# Patient Record
Sex: MALE | Race: OTHER | NOT HISPANIC OR LATINO | Employment: UNEMPLOYED | URBAN - METROPOLITAN AREA
[De-identification: names, ages, dates, MRNs, and addresses within clinical notes are randomized per-mention and may not be internally consistent; named-entity substitution may affect disease eponyms.]

---

## 2022-10-18 ENCOUNTER — HOSPITAL ENCOUNTER (EMERGENCY)
Facility: HOSPITAL | Age: 52
Discharge: HOME | End: 2022-10-18
Attending: EMERGENCY MEDICINE
Payer: COMMERCIAL

## 2022-10-18 VITALS
BODY MASS INDEX: 28.16 KG/M2 | HEART RATE: 99 BPM | HEIGHT: 72 IN | OXYGEN SATURATION: 100 % | WEIGHT: 207.88 LBS | RESPIRATION RATE: 20 BRPM | TEMPERATURE: 98 F | SYSTOLIC BLOOD PRESSURE: 181 MMHG | DIASTOLIC BLOOD PRESSURE: 104 MMHG

## 2022-10-18 DIAGNOSIS — M79.661 RIGHT CALF PAIN: Primary | ICD-10-CM

## 2022-10-18 DIAGNOSIS — S86.911A MUSCLE STRAIN OF RIGHT LOWER LEG, INITIAL ENCOUNTER: ICD-10-CM

## 2022-10-18 PROCEDURE — 99284 EMERGENCY DEPT VISIT MOD MDM: CPT | Mod: 25

## 2022-10-18 PROCEDURE — 96372 THER/PROPH/DIAG INJ SC/IM: CPT | Performed by: PHYSICIAN ASSISTANT

## 2022-10-18 PROCEDURE — 63600175 PHARM REV CODE 636 W HCPCS: Performed by: PHYSICIAN ASSISTANT

## 2022-10-18 RX ORDER — METHOCARBAMOL 500 MG/1
1000 TABLET, FILM COATED ORAL 3 TIMES DAILY PRN
Qty: 30 TABLET | Refills: 0 | Status: SHIPPED | OUTPATIENT
Start: 2022-10-18 | End: 2022-10-23

## 2022-10-18 RX ORDER — KETOROLAC TROMETHAMINE 10 MG/1
10 TABLET, FILM COATED ORAL EVERY 6 HOURS PRN
Qty: 20 TABLET | Refills: 0 | Status: SHIPPED | OUTPATIENT
Start: 2022-10-18 | End: 2022-10-23

## 2022-10-18 RX ORDER — KETOROLAC TROMETHAMINE 30 MG/ML
15 INJECTION, SOLUTION INTRAMUSCULAR; INTRAVENOUS
Status: COMPLETED | OUTPATIENT
Start: 2022-10-18 | End: 2022-10-18

## 2022-10-18 RX ADMIN — KETOROLAC TROMETHAMINE 15 MG: 30 INJECTION, SOLUTION INTRAMUSCULAR at 04:10

## 2022-10-18 NOTE — ED PROVIDER NOTES
Encounter Date: 10/18/2022       History     Chief Complaint   Patient presents with    Leg Pain     C/o left calf/leg pain and swelling since last night. HTN noted. Pt didn't take his bp medications this am.     Osito Durant is a 52 y.o. male with no past medical history who presents to the ED complaining of right calf pain. He reports that yesterday he was chasing his dog who jumped out of the house window when he bent his leg in a weird way and felt a pop in his calf. He developed immediate pain and swelling to his right lower leg. He reports pain with bearing weight onto his leg. He denies any numbness/tingling, N/V, fevers chills, back pain.    The history is provided by the patient. No  was used.   Review of patient's allergies indicates:  No Known Allergies  No past medical history on file.  No past surgical history on file.  No family history on file.     Review of Systems   Constitutional:  Negative for chills and fever.   HENT:  Negative for congestion and sneezing.    Eyes:  Negative for photophobia and visual disturbance.   Respiratory:  Negative for cough and shortness of breath.    Cardiovascular:  Negative for chest pain and leg swelling.   Gastrointestinal:  Negative for abdominal pain and constipation.   Genitourinary:  Negative for dysuria and frequency.   Musculoskeletal:  Positive for gait problem, joint swelling and myalgias. Negative for back pain.   Skin:  Negative for rash and wound.   Neurological:  Negative for dizziness and seizures.   Psychiatric/Behavioral:  Negative for agitation and confusion.      Physical Exam     Initial Vitals [10/18/22 1555]   BP Pulse Resp Temp SpO2   (!) 181/104 99 20 97.7 °F (36.5 °C) 100 %      MAP       --         Physical Exam    Nursing note and vitals reviewed.  Constitutional: He appears well-developed and well-nourished. No distress.   HENT:   Head: Normocephalic and atraumatic.   Mouth/Throat: No oropharyngeal exudate.   Eyes: EOM  are normal. No scleral icterus.   Neck: Neck supple.   Normal range of motion.  Cardiovascular:  Normal rate and regular rhythm.           No murmur heard.  Pulmonary/Chest: No respiratory distress. He has no wheezes.   Abdominal: Abdomen is soft. He exhibits no distension. There is no abdominal tenderness.   Musculoskeletal:         General: Tenderness (right calf) and edema (right lower leg) present.      Cervical back: Normal range of motion and neck supple.      Comments: Difficulty bearing weight onto right leg. Full passive and active ROM of right knee and foot. Exquisite TTP of right calf. Pulses palpable. Normal cap refill     Neurological: He is alert and oriented to person, place, and time. No cranial nerve deficit.   Skin: Skin is warm and dry. Capillary refill takes less than 2 seconds. No erythema.   Psychiatric: He has a normal mood and affect. Thought content normal.       ED Course   Procedures  Labs Reviewed - No data to display       Imaging Results              CT Leg (Tibia-Fibula) Without Contrast Right (Final result)  Result time 10/18/22 17:28:21      Final result by Lazaro Colunga MD (10/18/22 17:28:21)                   Impression:      Mild amount of soft tissue swelling in the anterolateral aspect of the leg otherwise unremarkable      Electronically signed by: Lazaro Colunga  Date:    10/18/2022  Time:    17:28               Narrative:    EXAMINATION:  CT LEG (TIBIA-FIBULA) WITHOUT CONTRAST RIGHT    CLINICAL HISTORY:  Lower leg trauma, no prior imaging;    TECHNIQUE:  Multiple axial images were obtained of the right leg without contrast administration and sagittal and coronal reconstructions were performed.  Automatic exposure control (AEC) is utilized to reduce patient radiation exposure.    COMPARISON:  .    FINDINGS:  The tibia and fibula appear normal.  No fracture is seen.  No dislocation is seen.  Distal femur appears normal.  Patella appears grossly unremarkable.  There  is some mild soft tissue swelling along the anterior aspect of the leg but no fluid collection is seen. No hematoma is seen. No mass is seen                                       Medications   ketorolac injection 15 mg (15 mg Intramuscular Given 10/18/22 4562)     Medical Decision Making:   ED Management:  The patient is resting comfortably and in no acute distress. I personally discussed his test results and treatment plan.  Gave strict ED precautions and specific conditions for return to the emergency department and importance of follow up with pcp.  Patient voices understanding and agrees to the plan discussed. All patients' questions have been answered at this time. He has remained hemodynamically stable throughout entire stay in ED and is stable for discharge home.                        Clinical Impression:   Final diagnoses:  [M79.661] Right calf pain (Primary)  [S86.911A] Muscle strain of right lower leg, initial encounter      ED Disposition Condition    Discharge Stable          ED Prescriptions       Medication Sig Dispense Start Date End Date Auth. Provider    ketorolac (TORADOL) 10 mg tablet Take 1 tablet (10 mg total) by mouth every 6 (six) hours as needed for Pain. 20 tablet 10/18/2022 10/23/2022 Breanne Kennedy PA-C    methocarbamoL (ROBAXIN) 500 MG Tab Take 2 tablets (1,000 mg total) by mouth 3 (three) times daily as needed (muscle spasms, pain). 30 tablet 10/18/2022 10/23/2022 Breanne Kennedy PA-C          Follow-up Information       Follow up With Specialties Details Why Contact Info    [facility] Emergency [facility]  If symptoms worsen [address] [facility] [address] [phone]    PCP  In 3 days Hospital follow up              Breanne Kennedy PA-C  10/18/22 1622

## 2022-10-18 NOTE — Clinical Note
"Osito Caoarsalan Durant was seen and treated in our emergency department on 10/18/2022.  He may return to work on 10/24/2022.       If you have any questions or concerns, please don't hesitate to call.       RN    "

## 2022-10-26 ENCOUNTER — HOSPITAL ENCOUNTER (OUTPATIENT)
Dept: RADIOLOGY | Facility: HOSPITAL | Age: 52
Discharge: HOME | End: 2022-10-26
Attending: STUDENT IN AN ORGANIZED HEALTH CARE EDUCATION/TRAINING PROGRAM
Payer: COMMERCIAL

## 2022-10-26 ENCOUNTER — OFFICE VISIT (OUTPATIENT)
Dept: ORTHOPEDICS | Facility: CLINIC | Age: 52
End: 2022-10-26
Payer: COMMERCIAL

## 2022-10-26 VITALS
HEART RATE: 83 BPM | BODY MASS INDEX: 28.04 KG/M2 | RESPIRATION RATE: 16 BRPM | WEIGHT: 207 LBS | HEIGHT: 72 IN | SYSTOLIC BLOOD PRESSURE: 175 MMHG | DIASTOLIC BLOOD PRESSURE: 109 MMHG

## 2022-10-26 DIAGNOSIS — M79.661 RIGHT CALF PAIN: ICD-10-CM

## 2022-10-26 DIAGNOSIS — S86.111A RUPTURE OF RIGHT GASTROCNEMIUS TENDON, INITIAL ENCOUNTER: Primary | ICD-10-CM

## 2022-10-26 PROCEDURE — 73590 X-RAY EXAM OF LOWER LEG: CPT | Mod: TC,RT

## 2022-10-26 PROCEDURE — 99215 OFFICE O/P EST HI 40 MIN: CPT | Mod: PBBFAC

## 2022-10-26 RX ORDER — NIFEDIPINE 60 MG/1
60 TABLET, EXTENDED RELEASE ORAL DAILY
Status: ON HOLD | COMMUNITY
Start: 2022-10-07 | End: 2023-01-28 | Stop reason: HOSPADM

## 2022-10-26 RX ORDER — LISINOPRIL 10 MG/1
1 TABLET ORAL DAILY
Status: ON HOLD | COMMUNITY
Start: 2022-04-25 | End: 2023-01-28 | Stop reason: HOSPADM

## 2022-10-26 NOTE — PROGRESS NOTES
Subjective:    Patient ID: Osito Durant is a 52 y.o. male  who presented to [facility] for new visit..      Chief Complaint: Pain of the Right Lower Leg    History of Present Illness:  Osito Durant who has no formally previously diagnosed musculoskeletal condition presented today with right calf pain with DOI 10/17/2022. Patient states that he was sitting on the couch and jumped out of his first floor window chasing his dog. Immediately he had calf pain and swelling. Subsequently patient was seen in the ED with CT scan not revealing any fractures or deformities. Patient has been unable to bear weight since that time. Has been using crutches for ambulation and taking diclofenac for pain control. States that his pain has been a 9/10. Pain exacerbated by any movement of the foot.      Knee Review of Systems:  Swelling?  yes  Limited ROM? yes  Fever/Chills? No    Current Choice of Exercise:  none       Objective:      Physical Exam:    BP (!) 175/109 Comment: Asymptoamtic. Dr mark.  Pulse 83   Resp 16   Ht 6' (1.829 m)   Wt 93.9 kg (207 lb)   BMI 28.07 kg/m²     Appearance:  NWB    Soft tissue swelling: Left: no Right: yes  Effusion: Left:  Negative Right: Negative  Erythema: Left no Right: no  Ecchymosis: Left: no Right: no      Palpation:  Ankle/Foot Tenderness: Left: non tender Right: mid calf pain ttp.    Range of motion:  Ankle dorsiflexion (20 degrees):     Left: 20 Right: 5  Ankle Plantar flexion (50 degrees): Left 50 Right: 10  Subtalar inversion (5 degrees): Left: 5 Right 1  Subtalar eversion (5 degrees):  Left: 5 Right 5  Transverse/midtarsal: adduction (20 degrees): Left: 20 Right: 5  Transverse/midtarsal abduction (10 degrees): Left: 10 Right: 10    Strength:  Foot inversion/dorsiflexion (Deep Peroneal N. L4):Left: 5/5  Pain: no Right: 2/5  Pain: yes  1st toe Dorsiflexion (Deep Peroneal N. L5): Left: 5/5  Pain: no Right: 2/5  Pain: yes  Foot  plantarflexion (Tibial N. S1): Left: 5/5  Pain: no Right: 2/5  Pain: yes  Foot eversion (superficial peroneal L4-S1): Left: 5/5  Pain: no Right: 5/5  Pain: no      Special Tests:  Parish:  Left: Not performed     Right: Not performed d/t pain    General appearance: NAD  Peripheral pulses: normal bilaterally   Reflexes: Left: normal Right normal   Sensation: normal    Labs:  Last A1c: The patient doesn't have any registry metric data available     Imaging:   Previous images reviewed.  X-rays ordered and performed today: no  Impression:  Mild amount of soft tissue swelling in the anterolateral aspect of the leg otherwise unremarkable    Limited US  Limited bedside ultrasound showing a medial head gastrocnemius tear with a hematoma 3cm x 2 cm of the RLE with intact achilles tendon.    Assessment:        Encounter Diagnoses   Name Primary?    Rupture of right gastrocnemius tendon, initial encounter Yes    Right calf pain         Plan:      Dx: Medial head gastrocnemius tear with DOI 10/17/2022  Acute in severe exacerbation.   Treatment Plan: Discussed with patient diagnosis, prognosis, and treatment recommendations. Education provided.  Home physical therapy exercise handouts provided to patient.   formal PT script provided to patient. You may take this script to whichever physical therapist you would like to go to.   Over the counter NSAID and/or tylenol provided you do not have contraindications such as but not limited to liver or kidney disease or uncontrolled blood pressure. If you're doctors have told you to to not take them based on your health, do not take them.   Imaging: prior radiological studies independently reviewed; discussed with patient; agree with radiologist interpretation.   Weight Management: is paramount. maintain healthy weight of a bmi of 24.9 or less..   Activity: Activity as tolerated; HEP to include aerobic conditioning and strength training with non-painful activity. ROM/STG exercises.  Proper footware; assistive devises to avoid limping.   Therapy: Physical Therapy  Medication: first line treatment with daily acetaminophen. Up to 1000 mg three times daily can be taken; medication precautions given.. Please see your primary care physician for further refills.  RTC: 4 weeks.    Anca Stiles

## 2022-10-26 NOTE — LETTER
October 26, 2022      [facility]  [facility] [facility] [address]  Phone: [phone]       Patient: Osito Durant   YOB: 1970  Date of Visit: 10/26/2022    To Whom It May Concern:    Jaquelin Durant  was at [facility] on 10/26/2022. The patient may return to work/school if applicable with restrictions. Next appointment with clinic 11/2022. The next 4 weeks please allow limited use of right leg as tolerated no prolong standing, walking, stooping or crawling. Allow for crutch use with slow ambulation. If you have any questions or concerns, or if I can be of further assistance, please do not hesitate to contact me.    Sincerely,  Dr. Jhonatan Roy MA

## 2022-11-02 NOTE — PROGRESS NOTES
Faculty Attestation: Osito Durant  was seen in Sports Medicine Clinic. Discussed with Dr. Stiles at the time of the visit. History of Present Illness, Physical Exam, and Assessment and Plan reviewed. Treatment plan is reasonable and appropriate. Compliance with treatment recommendations is important.  Radiology images independently reviewed and agree with radiologist interpretation.  No procedure was performed.     Nely Dhillon MD  Family/Sports Medicine

## 2022-11-23 ENCOUNTER — OFFICE VISIT (OUTPATIENT)
Dept: ORTHOPEDICS | Facility: CLINIC | Age: 52
End: 2022-11-23
Payer: COMMERCIAL

## 2022-11-23 VITALS
WEIGHT: 208 LBS | HEART RATE: 60 BPM | SYSTOLIC BLOOD PRESSURE: 164 MMHG | BODY MASS INDEX: 28.17 KG/M2 | DIASTOLIC BLOOD PRESSURE: 126 MMHG | HEIGHT: 72 IN

## 2022-11-23 DIAGNOSIS — S86.111A RUPTURE OF RIGHT GASTROCNEMIUS TENDON, INITIAL ENCOUNTER: Primary | ICD-10-CM

## 2022-11-23 PROCEDURE — 99213 OFFICE O/P EST LOW 20 MIN: CPT | Mod: PBBFAC

## 2022-11-23 NOTE — PROGRESS NOTES
"  Subjective:    Patient ID: Osito Durant is a 52 y.o. male  who presented to [facility] for new visit..    Chief Complaint: Pain of the Right Lower Leg    History of Present Illness:  Osito Durant who has no formally previously diagnosed musculoskeletal condition presented today for a right gastrocnemius muscle belly tear  with DOI 10/17/2022 after he jumped out of the 1st floor window chasing his dog.  Patient feels significantly better today than when he 1st came in to see us on October 26th.  States that the swelling has improved on his right lower extremity.  Pain is also improved.  He is now off of his crutches and ambulating as tolerated.  Has not been able to go to physical therapy yet.  Has been trying icing, over-the-counter analgesics, and elevation.  Pain is relatively well controlled and he has no take any analgesics over the last couple of weeks.  States that the worst pain is typically in the morning with the 1st few steps and also with the push-off phase of his walking.  Denies any skin color changes, right lower extremity warm/erythema, or any fevers chills.  Denies any loss of sensation to the right lower extremity.     Knee Review of Systems:  Swelling?  yes  Limited ROM? yes  Fever/Chills? No    Current Choice of Exercise:  none       Objective:      Physical Exam:    BP (!) 164/126 (BP Location: Left arm)   Pulse 60   Ht 6' 0.01" (1.829 m)   Wt 94.3 kg (208 lb)   BMI 28.20 kg/m²     Appearance:  WBAT    Soft tissue swelling: Left: no Right: yes, improved from prior visit with trace pitting edema  Effusion: Left:  Negative Right: Negative  Erythema: Left no Right: no  Ecchymosis: Left: no Right: no    Palpation:  Ankle/Foot Tenderness: Left: non tender Right: mid calf pain ttp.    Range of motion:  Ankle dorsiflexion (20 degrees):     Left: 20 Right: 10  Ankle Plantar flexion (50 degrees): Left 50 Right: 25  Subtalar inversion (5 degrees): " Left: 5 Right 3  Subtalar eversion (5 degrees):  Left: 5 Right 5  Transverse/midtarsal: adduction (20 degrees): Left: 20 Right: 10  Transverse/midtarsal abduction (10 degrees): Left: 10 Right: 10    Strength:  Foot inversion/dorsiflexion (Deep Peroneal N. L4):Left: 5/5  Pain: no Right: 3/5  Pain: yes  1st toe Dorsiflexion (Deep Peroneal N. L5): Left: 5/5  Pain: no Right: 3/5  Pain: yes  Foot plantarflexion (Tibial N. S1): Left: 5/5  Pain: no Right: 3/5  Pain: yes  Foot eversion (superficial peroneal L4-S1): Left: 5/5  Pain: no Right: 5/5  Pain: no      Special Tests:  Parish:  Left: Not performed     Right: Intact    General appearance: NAD  Peripheral pulses: normal bilaterally   Reflexes: Left: normal Right normal   Sensation: normal    Labs:  Last A1c: The patient doesn't have any registry metric data available     Imaging:   Previous images reviewed.  X-rays ordered and performed today: no  Impression:Mild amount of soft tissue swelling in the anterolateral aspect of the leg otherwise unremarkable    Limited US  Limited bedside ultrasound showing a medial head gastrocnemius tear with a hematoma around 2cm x 2 cm of the RLE with intact achilles tendon. Compressible popliteal vein.     Assessment:        Encounter Diagnosis   Name Primary?    Rupture of right gastrocnemius tendon, initial encounter Yes     Plan:      Dx: Medial head gastrocnemius tear with DOI 10/17/2022  Acute in severe exacerbation.   Treatment Plan: Discussed with patient diagnosis, prognosis, and treatment recommendations. Education provided.    - Formal PT script provided to patient at first visit / Home physical therapy exercise handouts provided to patient.   - Over the counter NSAID and/or tylenol provided you do not have contraindications such as but not limited to liver or kidney disease or uncontrolled blood pressure.   - Given the trace edema on the RLE, discussed the possible risk of a DVT. Although the bedside US shows compressible  popliteal veins, offered patient a r/o DVT US which he declined at this time. Also declined any more advanced imaging with MRI at this time  - Will allow patient to ambulate as tolerated with a SWB with heel wedge  - Patient to start off with 2 cm heel wedge and incrementally remove wedges over the next 2 weeks  - RTC to clinic in 4 weeks for re-evaluation   Imaging: prior radiological studies independently reviewed; discussed with patient; agree with radiologist interpretation.   Weight Management: is paramount. maintain healthy weight of a bmi of 24.9 or less..   Activity: Activity as tolerated  Therapy: Physical Therapy    RTC: 4 weeks.    Anca Stiles

## 2022-12-13 ENCOUNTER — OFFICE VISIT (OUTPATIENT)
Dept: ORTHOPEDICS | Facility: CLINIC | Age: 52
End: 2022-12-13
Payer: COMMERCIAL

## 2022-12-13 VITALS
SYSTOLIC BLOOD PRESSURE: 159 MMHG | WEIGHT: 212.63 LBS | DIASTOLIC BLOOD PRESSURE: 109 MMHG | HEIGHT: 72 IN | BODY MASS INDEX: 28.8 KG/M2 | HEART RATE: 80 BPM

## 2022-12-13 DIAGNOSIS — S86.111D RUPTURE OF RIGHT GASTROCNEMIUS TENDON, SUBSEQUENT ENCOUNTER: Primary | ICD-10-CM

## 2022-12-13 DIAGNOSIS — R60.0 LOWER EXTREMITY EDEMA: ICD-10-CM

## 2022-12-13 PROBLEM — I10 PRIMARY HYPERTENSION: Status: ACTIVE | Noted: 2022-12-13

## 2022-12-13 PROCEDURE — 99213 OFFICE O/P EST LOW 20 MIN: CPT | Mod: PBBFAC

## 2022-12-13 NOTE — PROGRESS NOTES
Subjective:    Patient ID: Osito Durant is a 52 y.o. male  who presented to [facility] for follow up.    Chief Complaint: Pain of the Right Lower Leg    History of Present Illness:  Osito Durant is a 52-year-old male who presents today for follow-up of right medial gastrocnemius tear.  Date of injury is 10/17/2022.  He was last seen on 11/23/2022.  Since that time, his pain has virtually resolved.  He continues in his walking boot but has removed all heel wedges.  He has been working with his neighbor who is a physical therapist but has not gone to formal PT. He continues to have notable swelling of his right lower extremity which has limited his ability to put on sneakers when he is at his home.  He is compliant with leg elevation and says that he does it for multiple hours a day but still has swelling of his leg.    Ankle/Foot Review of Systems:  Swelling?  yes  Instability?  no  Mechanical sx?  no  <30 min AM stiffness? no  Limited ROM? no  Fever/Chills? no     Objective:      Physical Exam:    BP (!) 159/109   Pulse 80   Ht 6' (1.829 m)   Wt 96.4 kg (212 lb 9.6 oz)   BMI 28.83 kg/m²     Appearance:  limping  Nonweightbearing to right lower extremity in walking boot  Soft tissue swelling: Left: no Right: yes  Erythema: Left no Right: no  Ecchymosis: Left: no Right: no  Decreased hair distribution of bilateral lower extremities from mid shin to his ankle    Palpation:  Ankle/Foot Tenderness: Left: non tender  Right:  Mild tenderness approximately 3-4 cm distal to the popliteal fossa on the medial aspect.    Range of motion:  Ankle dorsiflexion (20 degrees):     Left: 20 Right: 15  Ankle Plantar flexion (50 degrees): Left 50 Right: 40  Subtalar inversion (5 degrees): Left: 5 Right 5  Subtalar eversion (5 degrees):  Left: 5 Right 5  Transverse/midtarsal: adduction (20 degrees): Left: 20 Right: 20  Transverse/midtarsal abduction (10 degrees): Left: 10 Right:  10    Strength:  Foot inversion/dorsiflexion (Deep Peroneal N. L4):Left: 5/5  Pain: no Right: 5/5  Pain: no  1st toe Dorsiflexion (Deep Peroneal N. L5): Left: 5/5  Pain: no Right: 5/5  Pain: no  Foot plantarflexion (Tibial N. S1): Left: 5/5  Pain: no Right: 5/5  Pain: no  Foot eversion (superficial peroneal L4-S1): Left: 5/5  Pain: no Right: 5/5  Pain: no      Parish:  Left: Negative  Right: Negative     General appearance: NAD  Peripheral pulses: normal bilaterally   Sensation: normal    Imaging:   Previous images reviewed.  X-rays ordered and performed today: no  # of views: 4 Laterality: right  My Interpretation:  Mild soft tissue edema in the lower extremity.  No osseous abnormalities.  No acute fractures or dislocations appreciated.      Limited bedside ultrasound   Right medial head of the gastroc with small hematoma approximately 3-4 cm distal to the popliteal fossa at the musculotendinous junction measuring 1 cm X 1 cm.  Notable soft tissue edema of the right lower extremity.  Compressible veins throughout the lower extremity.    Assessment:      Encounter Diagnoses   Code Name Primary?    S86.111D Rupture of right gastrocnemius tendon, subsequent encounter Yes    R60.0 Lower extremity edema       Plan:     Dx: right gastrocnemius rupture at the musculotendinous junction with injury date 10/17/2022.  Continued right lower extremity edema likely due to venous insufficiency  with low concern for DVT given 2 separate bedside ultrasound with compressible vasculature.  Treatment Plan:   Discussed with patient diagnosis, prognosis, and treatment recommendations. Education provided.    Continue nonweightbearing to right lower extremity and use walking boot when ambulating.  Can remove walking boot while at home and nonambulatory   Continue to elevate leg when at rest making sure to keep leg above the level of the heart.  This should be done as often as possible throughout the day   Recommend doing calf pump  exercises nonweightbearing.  He should do at least 20 of these exercises every hour while awake.  He can continue working with his neighbor on his therapy.  However, if he does not see significant improvement, he should take the previously provided prescription to formal PT   Prescription provided for compression stocking which he should wear to the right lower extremity.  His physical exam and ultrasound findings do not provide concern for DVT.  His swelling is more likely related to venous insufficiency.  However, he was advised on the signs and symptoms of DVT and to report to the ER if he notices these symptoms.  He may return to work with the restrictions that he must be nonweightbearing to his right lower extremity.  Follow-up with PCP regarding increased fatigue after exertion.  Imaging: prior radiological studies independently reviewed; discussed with patient; agree with radiologist interpretation.   Weight Management: is paramount. maintain healthy weight of a bmi of 24.9 or less..   Procedure: Discussed CSI/VSI as treatment options; patient not a candidate for CSI or VS.  Activity:  Continue nonweightbearing to right lower extremity; HEP to include aerobic conditioning and strength training with non-painful activity. ROM/STG exercises.  Continue wearing boot on right lower extremity.    Therapy: Physical Therapy  Medication: first line treatment with daily acetaminophen. Up to 1000 mg three times daily can be taken; medication precautions given. and start OTC NSAIDs; medication precautions. Please see your primary care physician for further refills.  RTC:  3 weeks.      HTN:   Nonsteroidal anti-inflammatory drugs (NSAIDs) may disrupt control of blood pressure in hypertensive patients and increase their risk of morbidity, mortality, and the costs of care. In general, people with hypertension should use acetaminophen or possibly aspirin for over-the-counter pain relief. Discuss with your primary healthcare  provider if the use of NSAIDs is appropriate for you.

## 2022-12-14 NOTE — PROGRESS NOTES
Faculty Attestation: Osito Durant  was seen in [facility] Medicine Clinic. Discussed with Dr. Quezada at the time of the visit. History of Present Illness, Physical Exam, and Assessment and Plan reviewed. Treatment plan is reasonable and appropriate. Compliance with treatment recommendations is important.  Radiology images independently reviewed and agree with radiologist interpretation. No procedure was performed. Activity NWB to right lower extremity with range of motion activity out of boot including foot pumps.  Start formal PT.    Nely Dhillon MD  Family/Sports Medicine

## 2023-01-24 ENCOUNTER — HOSPITAL ENCOUNTER (INPATIENT)
Facility: HOSPITAL | Age: 53
LOS: 4 days | Discharge: HOME | DRG: 304 | End: 2023-01-28
Attending: FAMILY MEDICINE | Admitting: INTERNAL MEDICINE
Payer: COMMERCIAL

## 2023-01-24 DIAGNOSIS — I16.1 HYPERTENSIVE EMERGENCY: ICD-10-CM

## 2023-01-24 DIAGNOSIS — I10 PRIMARY HYPERTENSION: ICD-10-CM

## 2023-01-24 DIAGNOSIS — R53.1 WEAKNESS: ICD-10-CM

## 2023-01-24 DIAGNOSIS — I77.79 CELIAC ARTERY DISSECTION: Primary | ICD-10-CM

## 2023-01-24 LAB
ALBUMIN SERPL-MCNC: 3.7 G/DL (ref 3.5–5)
ALBUMIN/GLOB SERPL: 0.8 RATIO (ref 1.1–2)
ALP SERPL-CCNC: 91 UNIT/L (ref 40–150)
ALT SERPL-CCNC: 8 UNIT/L (ref 0–55)
AMPHET UR QL SCN: NEGATIVE
APPEARANCE UR: CLEAR
AST SERPL-CCNC: 17 UNIT/L (ref 5–34)
BACTERIA #/AREA URNS AUTO: ABNORMAL /HPF
BARBITURATE SCN PRESENT UR: NEGATIVE
BASOPHILS # BLD AUTO: 0.04 X10(3)/MCL (ref 0–0.2)
BASOPHILS NFR BLD AUTO: 0.6 %
BENZODIAZ UR QL SCN: NEGATIVE
BILIRUB UR QL STRIP.AUTO: NEGATIVE MG/DL
BILIRUBIN DIRECT+TOT PNL SERPL-MCNC: 0.5 MG/DL
BUN SERPL-MCNC: 13.5 MG/DL (ref 8.4–25.7)
CALCIUM SERPL-MCNC: 9.8 MG/DL (ref 8.4–10.2)
CANNABINOIDS UR QL SCN: NEGATIVE
CHLORIDE SERPL-SCNC: 103 MMOL/L (ref 98–107)
CO2 SERPL-SCNC: 24 MMOL/L (ref 22–29)
COCAINE UR QL SCN: NEGATIVE
COLOR UR AUTO: YELLOW
CREAT SERPL-MCNC: 2.1 MG/DL (ref 0.73–1.18)
EOSINOPHIL # BLD AUTO: 0.19 X10(3)/MCL (ref 0–0.9)
EOSINOPHIL NFR BLD AUTO: 2.8 %
ERYTHROCYTE [DISTWIDTH] IN BLOOD BY AUTOMATED COUNT: 13.5 % (ref 11.5–17)
FENTANYL UR QL SCN: NEGATIVE
GFR SERPLBLD CREATININE-BSD FMLA CKD-EPI: 38 MLS/MIN/1.73/M2
GLOBULIN SER-MCNC: 4.5 GM/DL (ref 2.4–3.5)
GLUCOSE SERPL-MCNC: 90 MG/DL (ref 74–100)
GLUCOSE UR QL STRIP.AUTO: NORMAL MG/DL
HCT VFR BLD AUTO: 44.6 % (ref 42–52)
HGB BLD-MCNC: 14.6 GM/DL (ref 14–18)
HYALINE CASTS #/AREA URNS LPF: ABNORMAL /LPF
IMM GRANULOCYTES # BLD AUTO: 0.02 X10(3)/MCL (ref 0–0.04)
IMM GRANULOCYTES NFR BLD AUTO: 0.3 %
KETONES UR QL STRIP.AUTO: NEGATIVE MG/DL
LACTATE SERPL-SCNC: 0.9 MMOL/L (ref 0.5–2.2)
LEUKOCYTE ESTERASE UR QL STRIP.AUTO: NEGATIVE UNIT/L
LYMPHOCYTES # BLD AUTO: 1.01 X10(3)/MCL (ref 0.6–4.6)
LYMPHOCYTES NFR BLD AUTO: 15 %
MAGNESIUM SERPL-MCNC: 2 MG/DL (ref 1.6–2.6)
MCH RBC QN AUTO: 28.1 PG
MCHC RBC AUTO-ENTMCNC: 32.7 MG/DL (ref 33–36)
MCV RBC AUTO: 85.9 FL (ref 80–94)
MDMA UR QL SCN: NEGATIVE
MONOCYTES # BLD AUTO: 0.63 X10(3)/MCL (ref 0.1–1.3)
MONOCYTES NFR BLD AUTO: 9.3 %
MUCOUS THREADS URNS QL MICRO: ABNORMAL /LPF
NEUTROPHILS # BLD AUTO: 4.86 X10(3)/MCL (ref 2.1–9.2)
NEUTROPHILS NFR BLD AUTO: 72 %
NITRITE UR QL STRIP.AUTO: NEGATIVE
NRBC BLD AUTO-RTO: 0 %
OPIATES UR QL SCN: NEGATIVE
PCP UR QL: NEGATIVE
PH UR STRIP.AUTO: 6 [PH]
PH UR: 6 [PH] (ref 3–11)
PLATELET # BLD AUTO: 170 X10(3)/MCL (ref 130–400)
PMV BLD AUTO: 12.7 FL (ref 7.4–10.4)
POTASSIUM SERPL-SCNC: 4.2 MMOL/L (ref 3.5–5.1)
PROT SERPL-MCNC: 8.2 GM/DL (ref 6.4–8.3)
PROT UR QL STRIP.AUTO: ABNORMAL MG/DL
RBC # BLD AUTO: 5.19 X10(6)/MCL (ref 4.7–6.1)
RBC #/AREA URNS AUTO: ABNORMAL /HPF
RBC UR QL AUTO: ABNORMAL UNIT/L
SODIUM SERPL-SCNC: 137 MMOL/L (ref 136–145)
SP GR UR STRIP.AUTO: 1.02
SQUAMOUS #/AREA URNS LPF: ABNORMAL /HPF
TROPONIN I SERPL-MCNC: <0.01 NG/ML (ref 0–0.04)
UROBILINOGEN UR STRIP-ACNC: NORMAL MG/DL
WBC # SPEC AUTO: 6.8 X10(3)/MCL (ref 4.5–11.5)
WBC #/AREA URNS AUTO: ABNORMAL /HPF

## 2023-01-24 PROCEDURE — 63600175 PHARM REV CODE 636 W HCPCS: Performed by: INTERNAL MEDICINE

## 2023-01-24 PROCEDURE — 82088 ASSAY OF ALDOSTERONE: CPT | Performed by: INTERNAL MEDICINE

## 2023-01-24 PROCEDURE — 86235 NUCLEAR ANTIGEN ANTIBODY: CPT | Performed by: INTERNAL MEDICINE

## 2023-01-24 PROCEDURE — 96375 TX/PRO/DX INJ NEW DRUG ADDON: CPT

## 2023-01-24 PROCEDURE — 20000000 HC ICU ROOM

## 2023-01-24 PROCEDURE — 63600175 PHARM REV CODE 636 W HCPCS: Performed by: FAMILY MEDICINE

## 2023-01-24 PROCEDURE — 25000003 PHARM REV CODE 250: Performed by: INTERNAL MEDICINE

## 2023-01-24 PROCEDURE — 25500020 PHARM REV CODE 255: Performed by: FAMILY MEDICINE

## 2023-01-24 PROCEDURE — 83605 ASSAY OF LACTIC ACID: CPT

## 2023-01-24 PROCEDURE — 83735 ASSAY OF MAGNESIUM: CPT | Performed by: FAMILY MEDICINE

## 2023-01-24 PROCEDURE — 99223 PR INITIAL HOSPITAL CARE,LEVL III: ICD-10-PCS | Mod: ,,, | Performed by: INTERNAL MEDICINE

## 2023-01-24 PROCEDURE — 81001 URINALYSIS AUTO W/SCOPE: CPT | Performed by: FAMILY MEDICINE

## 2023-01-24 PROCEDURE — 99291 CRITICAL CARE FIRST HOUR: CPT

## 2023-01-24 PROCEDURE — 80307 DRUG TEST PRSMV CHEM ANLYZR: CPT | Performed by: INTERNAL MEDICINE

## 2023-01-24 PROCEDURE — 96376 TX/PRO/DX INJ SAME DRUG ADON: CPT

## 2023-01-24 PROCEDURE — 84484 ASSAY OF TROPONIN QUANT: CPT | Performed by: FAMILY MEDICINE

## 2023-01-24 PROCEDURE — 93005 ELECTROCARDIOGRAM TRACING: CPT

## 2023-01-24 PROCEDURE — 85025 COMPLETE CBC W/AUTO DIFF WBC: CPT | Performed by: FAMILY MEDICINE

## 2023-01-24 PROCEDURE — 63600175 PHARM REV CODE 636 W HCPCS: Performed by: STUDENT IN AN ORGANIZED HEALTH CARE EDUCATION/TRAINING PROGRAM

## 2023-01-24 PROCEDURE — 99223 1ST HOSP IP/OBS HIGH 75: CPT | Mod: ,,, | Performed by: INTERNAL MEDICINE

## 2023-01-24 PROCEDURE — 80053 COMPREHEN METABOLIC PANEL: CPT | Performed by: FAMILY MEDICINE

## 2023-01-24 PROCEDURE — 25000003 PHARM REV CODE 250: Performed by: FAMILY MEDICINE

## 2023-01-24 PROCEDURE — 96365 THER/PROPH/DIAG IV INF INIT: CPT

## 2023-01-24 RX ORDER — LABETALOL HCL 20 MG/4 ML
20 SYRINGE (ML) INTRAVENOUS ONCE
Status: DISCONTINUED | OUTPATIENT
Start: 2023-01-24 | End: 2023-01-24

## 2023-01-24 RX ORDER — HYDRALAZINE HYDROCHLORIDE 20 MG/ML
20 INJECTION INTRAMUSCULAR; INTRAVENOUS
Status: COMPLETED | OUTPATIENT
Start: 2023-01-24 | End: 2023-01-24

## 2023-01-24 RX ORDER — ESMOLOL HYDROCHLORIDE 10 MG/ML
500 INJECTION INTRAVENOUS
Status: COMPLETED | OUTPATIENT
Start: 2023-01-24 | End: 2023-01-24

## 2023-01-24 RX ORDER — ENOXAPARIN SODIUM 100 MG/ML
40 INJECTION SUBCUTANEOUS EVERY 24 HOURS
Status: DISCONTINUED | OUTPATIENT
Start: 2023-01-24 | End: 2023-01-28 | Stop reason: HOSPADM

## 2023-01-24 RX ORDER — LABETALOL HCL 20 MG/4 ML
40 SYRINGE (ML) INTRAVENOUS
Status: COMPLETED | OUTPATIENT
Start: 2023-01-24 | End: 2023-01-24

## 2023-01-24 RX ORDER — ESMOLOL HYDROCHLORIDE 20 MG/ML
0-300 INJECTION, SOLUTION INTRAVENOUS CONTINUOUS
Status: DISCONTINUED | OUTPATIENT
Start: 2023-01-24 | End: 2023-01-25

## 2023-01-24 RX ORDER — HYDROMORPHONE HYDROCHLORIDE 1 MG/ML
1 INJECTION, SOLUTION INTRAMUSCULAR; INTRAVENOUS; SUBCUTANEOUS
Status: DISCONTINUED | OUTPATIENT
Start: 2023-01-24 | End: 2023-01-24

## 2023-01-24 RX ORDER — FAMOTIDINE 20 MG/1
20 TABLET, FILM COATED ORAL 2 TIMES DAILY
Status: DISCONTINUED | OUTPATIENT
Start: 2023-01-24 | End: 2023-01-28 | Stop reason: HOSPADM

## 2023-01-24 RX ORDER — MORPHINE SULFATE 2 MG/ML
4 INJECTION, SOLUTION INTRAMUSCULAR; INTRAVENOUS
Status: COMPLETED | OUTPATIENT
Start: 2023-01-24 | End: 2023-01-24

## 2023-01-24 RX ORDER — NAPROXEN SODIUM 220 MG/1
81 TABLET, FILM COATED ORAL DAILY
Status: DISCONTINUED | OUTPATIENT
Start: 2023-01-25 | End: 2023-01-26

## 2023-01-24 RX ORDER — PROMETHAZINE HYDROCHLORIDE 25 MG/ML
12.5 INJECTION, SOLUTION INTRAMUSCULAR; INTRAVENOUS ONCE
Status: COMPLETED | OUTPATIENT
Start: 2023-01-24 | End: 2023-01-24

## 2023-01-24 RX ORDER — LABETALOL HCL 20 MG/4 ML
20 SYRINGE (ML) INTRAVENOUS
Status: COMPLETED | OUTPATIENT
Start: 2023-01-24 | End: 2023-01-24

## 2023-01-24 RX ORDER — ASPIRIN 325 MG
325 TABLET ORAL DAILY
Status: DISCONTINUED | OUTPATIENT
Start: 2023-01-24 | End: 2023-01-25

## 2023-01-24 RX ORDER — NIFEDIPINE 30 MG/1
60 TABLET, EXTENDED RELEASE ORAL
Status: COMPLETED | OUTPATIENT
Start: 2023-01-24 | End: 2023-01-24

## 2023-01-24 RX ORDER — ACETAMINOPHEN 325 MG/1
650 TABLET ORAL
Status: COMPLETED | OUTPATIENT
Start: 2023-01-24 | End: 2023-01-24

## 2023-01-24 RX ORDER — SODIUM CHLORIDE 0.9 % (FLUSH) 0.9 %
10 SYRINGE (ML) INJECTION
Status: DISCONTINUED | OUTPATIENT
Start: 2023-01-24 | End: 2023-01-28 | Stop reason: HOSPADM

## 2023-01-24 RX ORDER — CLONIDINE HYDROCHLORIDE 0.1 MG/1
0.1 TABLET ORAL
Status: COMPLETED | OUTPATIENT
Start: 2023-01-24 | End: 2023-01-24

## 2023-01-24 RX ORDER — NIFEDIPINE 30 MG/1
60 TABLET, EXTENDED RELEASE ORAL DAILY
Status: DISCONTINUED | OUTPATIENT
Start: 2023-01-25 | End: 2023-01-24

## 2023-01-24 RX ORDER — ONDANSETRON 2 MG/ML
8 INJECTION INTRAMUSCULAR; INTRAVENOUS EVERY 6 HOURS PRN
Status: DISCONTINUED | OUTPATIENT
Start: 2023-01-24 | End: 2023-01-28 | Stop reason: HOSPADM

## 2023-01-24 RX ORDER — ESMOLOL HYDROCHLORIDE 20 MG/ML
0-300 INJECTION, SOLUTION INTRAVENOUS CONTINUOUS
Status: DISCONTINUED | OUTPATIENT
Start: 2023-01-24 | End: 2023-01-24

## 2023-01-24 RX ORDER — LISINOPRIL 10 MG/1
10 TABLET ORAL DAILY
Status: DISCONTINUED | OUTPATIENT
Start: 2023-01-25 | End: 2023-01-24

## 2023-01-24 RX ORDER — ASPIRIN 325 MG
325 TABLET ORAL ONCE
Status: DISCONTINUED | OUTPATIENT
Start: 2023-01-24 | End: 2023-01-25

## 2023-01-24 RX ORDER — LISINOPRIL 10 MG/1
10 TABLET ORAL
Status: COMPLETED | OUTPATIENT
Start: 2023-01-24 | End: 2023-01-24

## 2023-01-24 RX ADMIN — ESMOLOL HYDROCHLORIDE 47000 MCG: 100 INJECTION, SOLUTION INTRAVENOUS at 02:01

## 2023-01-24 RX ADMIN — ESMOLOL HYDROCHLORIDE 225 MCG/KG/MIN: 20 INJECTION INTRAVENOUS at 08:01

## 2023-01-24 RX ADMIN — ESMOLOL HYDROCHLORIDE 300 MCG/KG/MIN: 20 INJECTION INTRAVENOUS at 04:01

## 2023-01-24 RX ADMIN — IOHEXOL 100 ML: 350 INJECTION, SOLUTION INTRAVENOUS at 01:01

## 2023-01-24 RX ADMIN — ESMOLOL HYDROCHLORIDE 275 MCG/KG/MIN: 20 INJECTION INTRAVENOUS at 06:01

## 2023-01-24 RX ADMIN — ONDANSETRON 8 MG: 2 INJECTION INTRAMUSCULAR; INTRAVENOUS at 08:01

## 2023-01-24 RX ADMIN — ESMOLOL HYDROCHLORIDE 300 MCG/KG/MIN: 20 INJECTION INTRAVENOUS at 05:01

## 2023-01-24 RX ADMIN — ASPIRIN 325 MG ORAL TABLET 325 MG: 325 PILL ORAL at 08:01

## 2023-01-24 RX ADMIN — FAMOTIDINE 20 MG: 20 TABLET, FILM COATED ORAL at 08:01

## 2023-01-24 RX ADMIN — ESMOLOL HYDROCHLORIDE 125 MCG/KG/MIN: 20 INJECTION INTRAVENOUS at 10:01

## 2023-01-24 RX ADMIN — PROMETHAZINE HYDROCHLORIDE 12.5 MG: 25 INJECTION INTRAMUSCULAR; INTRAVENOUS at 04:01

## 2023-01-24 RX ADMIN — ESMOLOL HYDROCHLORIDE 50 MCG/KG/MIN: 20 INJECTION INTRAVENOUS at 02:01

## 2023-01-24 RX ADMIN — LABETALOL HYDROCHLORIDE 40 MG: 5 INJECTION, SOLUTION INTRAVENOUS at 03:01

## 2023-01-24 RX ADMIN — HYDRALAZINE HYDROCHLORIDE 20 MG: 20 INJECTION INTRAMUSCULAR; INTRAVENOUS at 11:01

## 2023-01-24 RX ADMIN — LISINOPRIL 10 MG: 10 TABLET ORAL at 11:01

## 2023-01-24 RX ADMIN — ENOXAPARIN SODIUM 40 MG: 40 INJECTION SUBCUTANEOUS at 05:01

## 2023-01-24 RX ADMIN — ACETAMINOPHEN 650 MG: 325 TABLET, FILM COATED ORAL at 12:01

## 2023-01-24 RX ADMIN — NIFEDIPINE 60 MG: 30 TABLET, FILM COATED, EXTENDED RELEASE ORAL at 11:01

## 2023-01-24 RX ADMIN — MORPHINE SULFATE 4 MG: 2 INJECTION, SOLUTION INTRAMUSCULAR; INTRAVENOUS at 02:01

## 2023-01-24 RX ADMIN — CLONIDINE HYDROCHLORIDE 0.1 MG: 0.1 TABLET ORAL at 12:01

## 2023-01-24 RX ADMIN — LABETALOL HYDROCHLORIDE 20 MG: 5 INJECTION, SOLUTION INTRAVENOUS at 01:01

## 2023-01-24 NOTE — ED PROVIDER NOTES
"Encounter Date: 1/24/2023       History     Chief Complaint   Patient presents with    Abdominal Pain     PT SENT FROM ORTHO CLINIC FOR EVAL OF ELEVATED BP, 223/129 IN TRIAGE; OUT OF MEDS X 4 DAYS.  CO MILD HA W BLURRED VISION ALONG W ABD/FLANK PAIN X 3 DAYS.  CO BLOATING, "LIKE FOOD NOT GOING DOWN" X 3 DAYS.  DENIES NVD. NO DYSURIA.  DENIES CP/SOB EKG OBTAINED.      Hypertension     51 y/o F presents to the ED with complaint of uncontrolled HTN, abdominal pain.     Onset- today  Duration- constant  Severity- mild- moderate   Context- pt reports hx of uncontrolled HTN. Pt reports he ran out of his BP 4 days ago. Pt reports his " BP is hard to control".  Associated symptoms- " kidney pain, abdominal bloating. Flank pain, blurry vision,   Modifying factors- none     The history is provided by the patient. No  was used.   Review of patient's allergies indicates:  No Known Allergies  Past Medical History:   Diagnosis Date    Hypertension     Primary hypertension 12/13/2022     Past Surgical History:   Procedure Laterality Date    NO PAST SURGERIES       History reviewed. No pertinent family history.  Social History     Tobacco Use    Smoking status: Every Day     Types: Cigars    Smokeless tobacco: Never   Substance Use Topics    Alcohol use: Never    Drug use: Never     Review of Systems   Constitutional:  Positive for fatigue. Negative for fever.   HENT:  Positive for trouble swallowing. Negative for drooling, sore throat and voice change.    Eyes:  Positive for visual disturbance.   Respiratory:  Negative for shortness of breath.    Cardiovascular:  Negative for chest pain, palpitations and leg swelling.   Gastrointestinal:  Positive for abdominal distention. Negative for diarrhea, nausea and vomiting.   Genitourinary:  Negative for dysuria.   Musculoskeletal:  Negative for back pain.   Skin:  Negative for rash.   Neurological:  Negative for dizziness, tremors, seizures, syncope, facial asymmetry, " speech difficulty, weakness, light-headedness, numbness and headaches.   All other systems reviewed and are negative.    Physical Exam     Initial Vitals [01/24/23 1022]   BP Pulse Resp Temp SpO2   (!) 223/129 74 18 98.6 °F (37 °C) 99 %      MAP       --         Physical Exam    Nursing note and vitals reviewed.  Constitutional: He appears well-developed and well-nourished. No distress.   HENT:   Head: Normocephalic and atraumatic.   Eyes: Conjunctivae are normal.   Cardiovascular:  Normal heart sounds and intact distal pulses.           Pulmonary/Chest: Breath sounds normal. No respiratory distress. He has no wheezes.   Abdominal: Abdomen is soft. Bowel sounds are normal. There is abdominal tenderness (mild diffuse and bilateral flanks). There is no rebound and no guarding.   Musculoskeletal:         General: No tenderness or edema. Normal range of motion.     Neurological: He is alert and oriented to person, place, and time. He has normal strength. No sensory deficit. Gait normal. GCS score is 15. GCS eye subscore is 4. GCS verbal subscore is 5. GCS motor subscore is 6.   Skin: Skin is warm and dry. Capillary refill takes less than 2 seconds.   Psychiatric: He has a normal mood and affect. His behavior is normal. Judgment and thought content normal.       ED Course   Critical Care    Date/Time: 1/24/2023 2:00 PM  Performed by: Gordy Roca MD  Authorized by: Gordy Roca MD   Total critical care time (exclusive of procedural time) : 30 minutes  Critical care was necessary to treat or prevent imminent or life-threatening deterioration of the following conditions: renal failure, dehydration, metabolic crisis, cardiac failure and circulatory failure.  Critical care was time spent personally by me on the following activities: development of treatment plan with patient or surrogate, discussions with consultants, examination of patient, obtaining history from patient or surrogate, ordering and performing treatments and  interventions, ordering and review of laboratory studies, ordering and review of radiographic studies, pulse oximetry, re-evaluation of patient's condition and review of old charts.      Labs Reviewed   COMPREHENSIVE METABOLIC PANEL - Abnormal; Notable for the following components:       Result Value    Creatinine 2.10 (*)     Globulin 4.5 (*)     Albumin/Globulin Ratio 0.8 (*)     All other components within normal limits   URINALYSIS, REFLEX TO URINE CULTURE - Abnormal; Notable for the following components:    Protein, UA 3+ (*)     Blood, UA Trace (*)     Squamous Epithelial Cells, UA Trace (*)     Mucous, UA Trace (*)     All other components within normal limits   CBC WITH DIFFERENTIAL - Abnormal; Notable for the following components:    MCHC 32.7 (*)     MPV 12.7 (*)     All other components within normal limits   MAGNESIUM - Normal   TROPONIN I - Normal   LACTIC ACID, PLASMA - Normal   CBC W/ AUTO DIFFERENTIAL    Narrative:     The following orders were created for panel order CBC auto differential.  Procedure                               Abnormality         Status                     ---------                               -----------         ------                     CBC with Differential[[id number]]        Abnormal            Final result                 Please view results for these tests on the individual orders.   EXTRA TUBES    Narrative:     The following orders were created for panel order EXTRA TUBES.  Procedure                               Abnormality         Status                     ---------                               -----------         ------                     Light Blue Top Hold[[id number]]                              In process                 Red Top Hold[[id number]]                                     In process                   Please view results for these tests on the individual orders.   LIGHT BLUE TOP HOLD   RED TOP HOLD   EXTRA TUBES    Narrative:     The following orders  were created for panel order EXTRA TUBES.  Procedure                               Abnormality         Status                     ---------                               -----------         ------                     Red Top Hold[[id number]]                                     In process                 Lavender Top Hold[[id number]]                                In process                 Gold Top Hold[[id number]]                                    In process                   Please view results for these tests on the individual orders.   RED TOP HOLD   LAVENDER TOP HOLD   GOLD TOP HOLD   ANTI-SCLERODERMA ANTIBODY   ANTI-DNA ANTIBODY, DOUBLE-STRANDED   ANTI-NEUTROPHILIC CYTOPLASMIC ANTIBODY   ALDOSTERONE, PLASMA   METANEPHRINES, PLASMA FREE   DRUG SCREEN, URINE (BEAKER)     EKG Readings: (Independently Interpreted)   Initial Reading: No STEMI. Rhythm: Normal Sinus Rhythm. Heart Rate: 73. Ectopy: No Ectopy. Conduction: Bifasicular. ST Segments: Normal ST Segments. T Waves: Normal.   ECG Results              EKG 12-lead (Weakness) Age > 50 (Final result)  Result time 01/24/23 14:06:31      Final result by Interface, Lab In [facility] (01/24/23 14:06:31)                   Narrative:    Test Reason : R53.1,    Vent. Rate : 073 BPM     Atrial Rate : 073 BPM     P-R Int : 162 ms          QRS Dur : 162 ms      QT Int : 426 ms       P-R-T Axes : 046 -65 -03 degrees     QTc Int : 469 ms    Normal sinus rhythm  Right bundle branch block  Left anterior fascicular block   Bifascicular block   Voltage criteria for left ventricular hypertrophy  Abnormal ECG  No previous ECGs available  Confirmed by Davidson Soria MD ([id number]) on 1/24/2023 2:06:23 PM    Referred By:             Confirmed By:Davidson Soria MD                                  Imaging Results              CTA Abdomen and Pelvis (Final result)  Result time 01/24/23 13:39:18      Final result by Mimi Banerjee MD (01/24/23 13:39:18)                   Impression:       1. No evidence of renal artery dissection or stenosis  2. 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  3. Changes of ankylosing spondylitis the spine and sacroiliac joints.      Electronically signed by: Mimi Banerjee  Date:    01/24/2023  Time:    13:39               Narrative:    EXAMINATION:  CTA ABDOMEN AND PELVIS    CLINICAL HISTORY:  Renal artery dissection suspected;    TECHNIQUE:  Helically acquired images were obtained from the lung bases to the pubic symphysis prior to and after IV administration of contrast. Axial, sagittal, coronal and MIP reformations were interpreted.    Automated tube current modulation, weight-based exposure dosing, and/or iterative reconstruction technique utilized to reach lowest reasonably achievable exposure rate.    DLP: 1434 mGy*cm    COMPARISON:  No relevant prior available for comparison at the time of dictation.    FINDINGS:  VASCULATURE:    Aorta: No significant atherosclerosis or aneurysm.    Mesenteric arteries: There is a dissection flap in the celiac artery with narrowing of the common hepatic artery.  Gastroduodenal and proper hepatic arteries are patent.    Renal arteries:No significant stenosis.  No dissection.    Iliac arteries: No significant stenosis.  Patent runoff to the groin.    HEART: Normal in size. No pericardial effusion.    LUNG BASES: Well aerated.    LIVER: Normal attenuation. No appreciable focal hepatic lesion.    BILIARY: No calcified gallstones.    PANCREAS: No inflammatory change.    SPLEEN: Normal in size    ADRENALS: No mass.    KIDNEYS/URETERS: The kidneys enhance symmetrically. No hydronephrosis. Incidental right renal cyst. No follow-up imaging is recommended as incidental lesions are likely benign.    GI TRACT/MESENTERY:  No evidence of bowel obstruction or inflammation. The appendix is normal.    PERITONEUM AND RETROPERITONEUM: No free fluid.No free air.    LYMPH NODES: No  enlarged lymph nodes by size criteria.    BLADDER: Normal appearance given degree of distention.    REPRODUCTIVE ORGANS: Prostatomegaly.    SOFT TISSUES: Small bilateral fat containing inguinal hernias.    BONES: There is ankylosis at the sacroiliac joints.  There are flowing syndesmophytes at the visible thoracolumbar spine.                                       Medications   esmolol 2000 mg in sodium chloride 0.9% 100 mL (20 mg/mL) (300 mcg/kg/min × 93 kg Intravenous Rate/Dose Change 1/24/23 1541)   sodium chloride 0.9% flush 10 mL (has no administration in time range)   famotidine tablet 20 mg (has no administration in time range)   hydrALAZINE injection 20 mg (20 mg Intravenous Given 1/24/23 1104)   lisinopriL tablet 10 mg (10 mg Oral Given 1/24/23 1104)   NIFEdipine 24 hr tablet 60 mg (60 mg Oral Given 1/24/23 1104)   cloNIDine tablet 0.1 mg (0.1 mg Oral Given 1/24/23 1212)   acetaminophen tablet 650 mg (650 mg Oral Given 1/24/23 1212)   iohexoL (OMNIPAQUE 350) injection 100 mL (100 mLs Intravenous Given 1/24/23 1311)   labetalol 20 mg/4 mL (5 mg/mL) IV syring (20 mg Intravenous Given 1/24/23 1344)   esmoloL injection 47,000 mcg (47,000 mcg Intravenous Given 1/24/23 1415)   morphine injection 4 mg (4 mg Intravenous Given 1/24/23 1439)   labetalol 20 mg/4 mL (5 mg/mL) IV syring (40 mg Intravenous Given 1/24/23 1554)     Medical Decision Making:   Pt reexamined multiple times in ED. Pt reports he is feeling better. He reports his vision is back to normal and no further blurry vision at this time. Pt also reports his symptoms are all improving.   Pt given multiple meds with minimal effect on BP. Reviewed CT findings - pt placed on esmolol drip . Surgery consulted who spoke to Dr. Eckert ( vascular surgeon)  and recommends no acute surgical intervention and recommend admission for BP management.  Medicine consulted for admission. Pt to be admitted to ICU.                            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

## 2023-01-24 NOTE — CONSULTS
General Surgery  Consult Note    SUBJECTIVE:     Chief Complaint: abdominal and bilateral flank pain; high blood pressure    History of Present Illness:  Mr. Durant is a 52 y.o. male with poorly controlled HTN presented to ED from ortho clinic for elevated BP of 223/129. He has been off his medications for 4 days. Patient reports abdominal pain, abdominal distension, flank pain, blurry vision, headache, and sensation of food bolus for the past 3 days. He feels like food gets stuck midway when he swallows, he does not describe chocking or needing to vomit. He reports the stomach distention also occurs with liquid. Denies any fever, nausea, and vomiting. No pain with ambulating, dizziness, or syncope. Patient reports his BP at home is usually in the 160-170's systolic. Last visit to his PCP was a year ago. He smokes cigars daily.    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     Home Medications:  - lisinopril 10mg  - nifedipine 60mg    OBJECTIVE:     Vital Signs:  Temp: 98.6 °F (37 °C) (23 1022)  Pulse: 73 (23 1528)  Resp: 16 (23 1515)  BP: (!) 142/100 (23 1528)  SpO2: 98 % (23 1528)    Physical Exam:  General: well developed, well nourished, no distress, non toxic appearing  HEENT: NCAT, EOMI  Neck: supple, symmetrical  Lungs: Normal WOB, No SOB, on RA  Abdomen: soft, with mild guarding, slightly distended, nontender to palpation, no peritonitis, no rebound tenderness    Labs:  CBC: WNL   CMP: Cr (2.1)  UA: Protein 3+  M.00  Troponin: <0.010  Lactic acid: 0.9    CTA Abdomen  Impression:  1. No evidence of renal artery dissection or stenosis  2. 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  3. Changes of ankylosing spondylitis the spine and sacroiliac joints.    ASSESSMENT:     53 yo male with poorly controlled hypertension presents with blood  pressure of 223/129. Surgery consulted due to CTA showing celiac artery dissection. Pt with hypertensive crisis and chronic kidney disease (Cr >2.7 for the past year).    PLAN:     Recommend admit to medicine for aggressive BP control.  Recommend adjustments to pt's home BP meds as he states his BP is usually I SBP of 170 despite taking his BP meds.  Recommend beginning full dose aspirin therapy now.  Recommend Lov 40 for DVT ppx.  Recommend serial abdominal exams  Recommend CMP tomorrow to monitor LFTs.  Once achieve adequate blood pressure control  (SBP < 140), start patient on Plavix.  No surgical intervention recommend at this time, given no changes in physical exam and no increase in LFTs, surgery will sign off on 1/25.  Please call us with any acute changes in abdominal exam.    Mansi Chinchilla, MS3  General Surgery     Note reviewed and edited as needed.    Jelly Madrigal M.D  [facility], PGY-1

## 2023-01-24 NOTE — MEDICAL/APP STUDENT
General Surgery  Consult Note    SUBJECTIVE:     Chief Complaint: abdominal and flank pain; high blood pressure    History of Present Illness:  Mr. Durant is a 52 y.o. male with poorly controlled HTN presented to ED from ortho clinic for elevated BP of 223/129. He has been off his medications for 4 days. Patient reports abdominal pain, abdominal distension, flank pain, blurry vision, headache, and dysphagia for the past 3 days. He feels like food gets stuck midway when he swallows. Denies any fever, nausea, and vomiting. No pain with ambulating, dizziness, or syncope. Patient reports his BP at home is usually in the 160-170's systolic. Last visit to his PCP was a year ago. He smokes cigars daily.    Surgery was consulted by medicine team after CTA shows celiac artery dissection with narrowing of the common hepatic artery.     Home Medications:  - lisinopril 10mg  - nifedipine 60mg    OBJECTIVE:     Vital Signs:  Temp: 98.6 °F (37 °C) (23 1022)  Pulse: 78 (23 1501)  Resp: 16 (23 1501)  BP: (!) 170/111 (23 1501)  SpO2: 96 % (23 1501)    Physical Exam:  General: well developed, well nourished, no distress  HEENT: NCAT, EOMI  Neck: supple, symmetrical  Lungs: Normal WOB, No SOB  Abdomen: soft, nondistended, nontender to palpation    Labs:  CBC WNL  CMP: Cr (2.1)  UA: Protein 3+  M.00  Troponin: <0.010  Lactic acid: 0.9    CTA Abdomen  Impression:  1. No evidence of renal artery dissection or stenosis  2. 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  3. Changes of ankylosing spondylitis the spine and sacroiliac joints.    ASSESSMENT:     53 yo male with poorly controlled hypertension presents with blood pressure of 223/129. Surgery consulted due to CTA showing celiac artery dissection.    PLAN:     No surgical intervention recommended.  Admit to medicine team inpatient to monitor.  Start aspirin therapy. Get blood pressure under  control and start patient on Plavix      Mansi Chinchilla, MS3  General Surgery

## 2023-01-24 NOTE — H&P
[facility] - Emergency Dept  Pulmonary Critical Care Note    Patient Name: Osito Durant  MRN: [id number]  Admission Date: 1/24/2023  Hospital Length of Stay: 0 days  Code Status: No Order  Attending Provider: Gordy Roca MD  Primary Care Provider: Primary Doctor No     Subjective:     HPI:   52-year-old  male with past medical history of hypertension who presents the emergency department with hypertensive emergency.  Patient was being seen in Orthopedic Clinic but was sent to the emergency department after blood pressure noted to be significantly elevated.  Blood pressure initially in emergency department was 223/129 with the highest recorded at 260/129.  Patient states that she has a history of hypertension and has been on lisinopril 10 and nifedipine 60.  Despite taking medications blood pressure persistently elevated with systolic reportedly 160-170.  For the past 3 days he has not been able take his medication after running out and this is when he began to have abdominal fullness and bilateral flank pain.  He states that whenever he eats his abdomen gets distended and has worsening flank/kidney pain.  Associated with a high blood pressure he also complaints of dizziness, presyncope, headache, and blurry vision.  At this time he states that dizziness, presyncope, and headache have resolved but continues to have some blurry vision.  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  No evidence of renal artery dissection or stenosis.  Incidental findings of ankylosing spondylitis of the spine and sacroiliac joints.    Hospital Course/Significant events:      24 Hour Interval History:      Past Medical History:   Diagnosis Date    Hypertension     Primary hypertension 12/13/2022       Past Surgical History:   Procedure Laterality Date    NO PAST SURGERIES         Social History     Socioeconomic History    Marital  status:    Tobacco Use    Smoking status: Every Day     Types: Cigars    Smokeless tobacco: Never   Substance and Sexual Activity    Alcohol use: Never    Drug use: Never           Current Outpatient Medications   Medication Instructions    lisinopriL 10 MG tablet 1 tablet, Oral, Daily    NIFEdipine (PROCARDIA-XL) 60 mg, Oral, Daily       Current Inpatient Medications      Current Intravenous Infusions   esmolol 250 mcg/kg/min (01/24/23 1528)         Review of Systems   Constitutional:  Negative for chills and fever.   Eyes:  Positive for blurred vision. Negative for double vision and photophobia.   Respiratory:  Negative for cough and shortness of breath.    Cardiovascular:  Negative for chest pain, palpitations and leg swelling.   Gastrointestinal:  Negative for abdominal pain, nausea and vomiting.   Genitourinary:  Positive for flank pain. Negative for dysuria, frequency and hematuria.   Musculoskeletal:  Negative for neck pain.   Neurological:  Negative for dizziness and headaches.        Objective:     No intake or output data in the 24 hours ending 01/24/23 1530      Vital Signs (Most Recent):  Temp: 98.6 °F (37 °C) (01/24/23 1022)  Pulse: 73 (01/24/23 1528)  Resp: 16 (01/24/23 1515)  BP: (!) 142/100 (01/24/23 1528)  SpO2: 98 % (01/24/23 1528)    Body mass index is 27.81 kg/m².  Weight: 93 kg (205 lb 0.4 oz) Vital Signs (24h Range):  Temp:  [98.6 °F (37 °C)] 98.6 °F (37 °C)  Pulse:  [73-89] 73  Resp:  [13-21] 16  SpO2:  [95 %-99 %] 98 %  BP: (142-260)/() 142/100     Physical Exam  Vitals and nursing note reviewed.   Constitutional:       General: He is not in acute distress.     Appearance: Normal appearance. He is normal weight. He is not ill-appearing or toxic-appearing.   HENT:      Head: Normocephalic and atraumatic.      Mouth/Throat:      Mouth: Mucous membranes are moist.      Pharynx: Oropharynx is clear.   Eyes:      General: No scleral icterus.        Right eye: No discharge.          Left eye: No discharge.      Extraocular Movements: Extraocular movements intact.      Conjunctiva/sclera: Conjunctivae normal.      Pupils: Pupils are equal, round, and reactive to light.   Cardiovascular:      Rate and Rhythm: Normal rate and regular rhythm.      Pulses: Normal pulses.      Heart sounds: Normal heart sounds. No murmur heard.    No gallop.   Pulmonary:      Effort: Pulmonary effort is normal. No respiratory distress.      Breath sounds: Normal breath sounds. No stridor. No wheezing, rhonchi or rales.   Abdominal:      General: Bowel sounds are normal. There is no distension.      Palpations: Abdomen is soft.      Tenderness: There is no abdominal tenderness. There is no guarding or rebound.   Musculoskeletal:         General: No swelling.      Right lower leg: No edema.      Left lower leg: No edema.   Skin:     General: Skin is warm and dry.   Neurological:      General: No focal deficit present.      Mental Status: He is alert and oriented to person, place, and time.   Psychiatric:         Mood and Affect: Mood normal.         Behavior: Behavior normal.         Lines/Drains/Airways       Peripheral Intravenous Line  Duration                  Peripheral IV - Single Lumen 01/24/23 1057 20 G Anterior;Right Wrist <1 day         Peripheral IV - Single Lumen 01/24/23 1438 20 G Left Antecubital <1 day                    Significant Labs:    Lab Results   Component Value Date    WBC 6.8 01/24/2023    HGB 14.6 01/24/2023    HCT 44.6 01/24/2023    MCV 85.9 01/24/2023     01/24/2023         BMP  Lab Results   Component Value Date     01/24/2023    K 4.2 01/24/2023    CHLORIDE 103 01/24/2023    CO2 24 01/24/2023    BUN 13.5 01/24/2023    CREATININE 2.10 (H) 01/24/2023    CALCIUM 9.8 01/24/2023    EGFRNONAA 54 (L) 10/25/2017       ABG  No results for input(s): PH, PO2, PCO2, HCO3, BE in the last 168 hours.    Mechanical Ventilation Support:       Significant Imaging:  I have reviewed the  pertinent imaging within the past 24 hours.        Assessment/Plan:     Assessment  Celiac artery dissection  Hypertensive emergency  ANA PAULA      Plan  -Consult Vascular Surgery for recommendations  -Continue Esmolol for hypertension. Will resume home Lisinopril and Nifedipine at increased dose when Cr improves. Blood pressure goal today 150-160  -Will workup for secondary hypertension with Aldosterone, renin, plasma metanephrines, ANDREI, ANCA, Anti DNA, Anti scleroderma  -Encouraged oral intake. Suspect Cr should improve with improved Blood pressure      DVT Prophylaxis:SCD  GI Prophylaxis:famotidine     32 minutes of critical care was time spent personally by me on the following activities: development of treatment plan with patient or surrogate and bedside caregivers, discussions with consultants, evaluation of patient's response to treatment, examination of patient, ordering and performing treatments and interventions, ordering and review of laboratory studies, ordering and review of radiographic studies, pulse oximetry, re-evaluation of patient's condition.  This critical care time did not overlap with that of any other provider or involve time for any procedures.     Tyron Tamayo DO  Pulmonary Critical Care Medicine  [facility] - Emergency Dept

## 2023-01-25 LAB
ALBUMIN SERPL-MCNC: 3.2 G/DL (ref 3.5–5)
ALBUMIN/GLOB SERPL: 0.9 RATIO (ref 1.1–2)
ALP SERPL-CCNC: 75 UNIT/L (ref 40–150)
ALT SERPL-CCNC: 5 UNIT/L (ref 0–55)
AST SERPL-CCNC: 12 UNIT/L (ref 5–34)
BASOPHILS # BLD AUTO: 0.04 X10(3)/MCL (ref 0–0.2)
BASOPHILS NFR BLD AUTO: 0.6 %
BILIRUBIN DIRECT+TOT PNL SERPL-MCNC: 0.5 MG/DL
BUN SERPL-MCNC: 14.4 MG/DL (ref 8.4–25.7)
CALCIUM SERPL-MCNC: 9.7 MG/DL (ref 8.4–10.2)
CHLORIDE SERPL-SCNC: 106 MMOL/L (ref 98–107)
CO2 SERPL-SCNC: 22 MMOL/L (ref 22–29)
CREAT SERPL-MCNC: 2.08 MG/DL (ref 0.73–1.18)
EOSINOPHIL # BLD AUTO: 0.11 X10(3)/MCL (ref 0–0.9)
EOSINOPHIL NFR BLD AUTO: 1.8 %
ERYTHROCYTE [DISTWIDTH] IN BLOOD BY AUTOMATED COUNT: 13.7 % (ref 11.5–17)
EST. AVERAGE GLUCOSE BLD GHB EST-MCNC: 102.5 MG/DL
GFR SERPLBLD CREATININE-BSD FMLA CKD-EPI: 38 MLS/MIN/1.73/M2
GLOBULIN SER-MCNC: 3.7 GM/DL (ref 2.4–3.5)
GLUCOSE SERPL-MCNC: 105 MG/DL (ref 74–100)
HBA1C MFR BLD: 5.2 %
HCT VFR BLD AUTO: 41.1 % (ref 42–52)
HGB BLD-MCNC: 13.2 GM/DL (ref 14–18)
IMM GRANULOCYTES # BLD AUTO: 0.02 X10(3)/MCL (ref 0–0.04)
IMM GRANULOCYTES NFR BLD AUTO: 0.3 %
LYMPHOCYTES # BLD AUTO: 1 X10(3)/MCL (ref 0.6–4.6)
LYMPHOCYTES NFR BLD AUTO: 16.1 %
MAGNESIUM SERPL-MCNC: 1.9 MG/DL (ref 1.6–2.6)
MCH RBC QN AUTO: 27.8 PG
MCHC RBC AUTO-ENTMCNC: 32.1 MG/DL (ref 33–36)
MCV RBC AUTO: 86.5 FL (ref 80–94)
MONOCYTES # BLD AUTO: 0.66 X10(3)/MCL (ref 0.1–1.3)
MONOCYTES NFR BLD AUTO: 10.6 %
NEUTROPHILS # BLD AUTO: 4.39 X10(3)/MCL (ref 2.1–9.2)
NEUTROPHILS NFR BLD AUTO: 70.6 %
NRBC BLD AUTO-RTO: 0 %
PHOSPHATE SERPL-MCNC: 3 MG/DL (ref 2.3–4.7)
PLATELET # BLD AUTO: 131 X10(3)/MCL (ref 130–400)
PLATELETS.RETICULATED NFR BLD AUTO: 5.1 % (ref 0.9–11.2)
PMV BLD AUTO: 10.4 FL (ref 7.4–10.4)
POTASSIUM SERPL-SCNC: 4.4 MMOL/L (ref 3.5–5.1)
PROT SERPL-MCNC: 6.9 GM/DL (ref 6.4–8.3)
RBC # BLD AUTO: 4.75 X10(6)/MCL (ref 4.7–6.1)
SODIUM SERPL-SCNC: 137 MMOL/L (ref 136–145)
WBC # SPEC AUTO: 6.2 X10(3)/MCL (ref 4.5–11.5)

## 2023-01-25 PROCEDURE — 84100 ASSAY OF PHOSPHORUS: CPT | Performed by: INTERNAL MEDICINE

## 2023-01-25 PROCEDURE — 25000003 PHARM REV CODE 250: Performed by: INTERNAL MEDICINE

## 2023-01-25 PROCEDURE — 83036 HEMOGLOBIN GLYCOSYLATED A1C: CPT | Performed by: INTERNAL MEDICINE

## 2023-01-25 PROCEDURE — 85025 COMPLETE CBC W/AUTO DIFF WBC: CPT | Performed by: INTERNAL MEDICINE

## 2023-01-25 PROCEDURE — 86036 ANCA SCREEN EACH ANTIBODY: CPT | Performed by: INTERNAL MEDICINE

## 2023-01-25 PROCEDURE — 86225 DNA ANTIBODY NATIVE: CPT | Performed by: INTERNAL MEDICINE

## 2023-01-25 PROCEDURE — 36415 COLL VENOUS BLD VENIPUNCTURE: CPT | Performed by: INTERNAL MEDICINE

## 2023-01-25 PROCEDURE — 80053 COMPREHEN METABOLIC PANEL: CPT | Performed by: INTERNAL MEDICINE

## 2023-01-25 PROCEDURE — 63600175 PHARM REV CODE 636 W HCPCS: Performed by: INTERNAL MEDICINE

## 2023-01-25 PROCEDURE — 83735 ASSAY OF MAGNESIUM: CPT | Performed by: INTERNAL MEDICINE

## 2023-01-25 PROCEDURE — 21400001 HC TELEMETRY ROOM

## 2023-01-25 PROCEDURE — 83835 ASSAY OF METANEPHRINES: CPT | Performed by: INTERNAL MEDICINE

## 2023-01-25 RX ORDER — NIFEDIPINE 30 MG/1
90 TABLET, EXTENDED RELEASE ORAL DAILY
Status: DISCONTINUED | OUTPATIENT
Start: 2023-01-25 | End: 2023-01-28 | Stop reason: HOSPADM

## 2023-01-25 RX ORDER — NIFEDIPINE 30 MG/1
60 TABLET, EXTENDED RELEASE ORAL DAILY
Status: DISCONTINUED | OUTPATIENT
Start: 2023-01-25 | End: 2023-01-25

## 2023-01-25 RX ORDER — LISINOPRIL 10 MG/1
10 TABLET ORAL DAILY
Status: DISCONTINUED | OUTPATIENT
Start: 2023-01-25 | End: 2023-01-25

## 2023-01-25 RX ORDER — LABETALOL 100 MG/1
100 TABLET, FILM COATED ORAL EVERY 12 HOURS
Status: DISCONTINUED | OUTPATIENT
Start: 2023-01-25 | End: 2023-01-26

## 2023-01-25 RX ADMIN — ESMOLOL HYDROCHLORIDE 50 MCG/KG/MIN: 20 INJECTION INTRAVENOUS at 03:01

## 2023-01-25 RX ADMIN — ENOXAPARIN SODIUM 40 MG: 40 INJECTION SUBCUTANEOUS at 05:01

## 2023-01-25 RX ADMIN — FAMOTIDINE 20 MG: 20 TABLET, FILM COATED ORAL at 08:01

## 2023-01-25 RX ADMIN — FAMOTIDINE 20 MG: 20 TABLET, FILM COATED ORAL at 09:01

## 2023-01-25 RX ADMIN — NIFEDIPINE 90 MG: 30 TABLET, FILM COATED, EXTENDED RELEASE ORAL at 09:01

## 2023-01-25 RX ADMIN — LABETALOL HYDROCHLORIDE 100 MG: 100 TABLET, FILM COATED ORAL at 09:01

## 2023-01-25 RX ADMIN — LABETALOL HYDROCHLORIDE 100 MG: 100 TABLET, FILM COATED ORAL at 08:01

## 2023-01-25 RX ADMIN — ASPIRIN 81 MG CHEWABLE TABLET 81 MG: 81 TABLET CHEWABLE at 09:01

## 2023-01-25 NOTE — PROGRESS NOTES
Inpatient Nutrition Evaluation    Admit Date: 2023   Total duration of encounter: 1 day    Nutrition Recommendation/Prescription     Continue heart healthy diet.  Will order boost supplement bid (decreased appetite x 1 day)  Pt education on diet/complete  MVI/fe  Biweekly wt  Will monitor nutrition status     Nutrition Assessment     Chart Review    Reason Seen: continuous nutrition monitoring and malnutrition screening tool (MST)    Malnutrition Screening Tool Results   Have you recently lost weight without trying?: Unsure  Have you been eating poorly because of a decreased appetite?: Yes   MST Score: 3     Diagnosis:  HTN, CKD, celiac artery dissection     Relevant Medical History: HTN     Nutrition-Related Medications: aspirin, famotidine     Nutrition-Related Labs:  () H/H 13.2/41.1(L) Gluc 105 Bun 14.4 Cr 2.0(H) GFR 38(L) alb 3.2(L)     Diet Order: Diet heart healthy No Added Salt  Oral Supplement Order: none  Appetite/Oral Intake: fair/50-75% of meals  Factors Affecting Nutritional Intake: abdominal distension and decreased appetite  Food/Denominational/Cultural Preferences: none reported  Food Allergies: none reported       Wound(s):   none    Comments    () Pt reported decreased appetite x 1 days; usually eats well; UBW approx 205#--no drastic wt loss; admited with HTN/CKD; celiac artery dissection---no surgical intervention at this time. Discussed Low Na diet. Labs acknowledged.     Anthropometrics    Height: 6' (182.9 cm) Height Method: Stated  Last Weight: 93 kg (205 lb 0.4 oz) (23 1022) Weight Method: Standard Scale  BMI (Calculated): 27.8  BMI Classification: overweight (BMI 25-29.9)        Ideal Body Weight (IBW), Male: 178 lb     % Ideal Body Weight, Male (lb): 115.19 %                 Usual Body Weight (UBW), k kg  % Usual Body Weight: 100.21     Usual Weight Provided By: patient and EMR weight history    Wt Readings from Last 3 Encounters:   23 1022 93 kg (205 lb 0.4 oz)    01/24/23 0957 94.5 kg (208 lb 6.4 oz)   12/13/22 1027 96.4 kg (212 lb 9.6 oz)      Weight Change(s) Since Admission:  Admit Weight: 93 kg (205 lb 0.4 oz) (01/24/23 1022)  Pt reported no drastic wt loss--UBW approx 205 #     Patient Education    Education Provided: low sodium diet  Teaching Method: explanation and printed materials  Comprehension: verbalizes understanding  Barriers to Learning: none evident  Expected Compliance: good  Comments: All questions were answered and dietitian's contact information was provided.     Monitoring & Evaluation     Dietitian will monitor food and beverage intake and weight.  Nutrition Risk/Follow-Up: low (follow-up in 5-7 days)  Patients assigned 'low nutrition risk' status do not qualify for a full nutritional assessment but will be monitored and re-evaluated in a 5-7 day time period. Please consult if re-evaluation needed sooner.

## 2023-01-25 NOTE — PLAN OF CARE
01/25/23 1009   Discharge Assessment   Assessment Type Discharge Planning Assessment   Confirmed/corrected address, phone number and insurance Yes   Confirmed Demographics Correct on Facesheet   Source of Information patient   Reason For Admission Celiac artery dissecton   People in Home child(yoon), adult   Facility Arrived From: [facility]   Do you expect to return to your current living situation? Yes   Do you have help at home or someone to help you manage your care at home? Yes   Who are your caregiver(s) and their phone number(s)? DghtrRoss ([phone])   Prior to hospitilization cognitive status: Alert/Oriented   Current cognitive status: Alert/Oriented   Equipment Currently Used at Home none   Readmission within 30 days? No   Patient currently being followed by outpatient case management? No   Do you currently have service(s) that help you manage your care at home? No   Do you take prescription medications? Yes  ([facility] [place])   Do you have prescription coverage? No   Do you have any problems affording any of your prescribed medications? No   Is the patient taking medications as prescribed? yes   Who is going to help you get home at discharge? Drove self   How do you get to doctors appointments? car, drives self   Are you on dialysis? No   Discharge Plan A Home with family   DME Needed Upon Discharge  none   Discharge Plan discussed with: Patient   Discharge Barriers Identified None   Physical Activity   On average, how many days per week do you engage in moderate to strenuous exercise (like a brisk walk)? 5 days   On average, how many minutes do you engage in exercise at this level? 100 min   Financial Resource Strain   How hard is it for you to pay for the very basics like food, housing, medical care, and heating? Not hard   Housing Stability   In the last 12 months, was there a time when you were not able to pay the mortgage or rent on time? N   In the last 12 months, how many  places have you lived? 1   Transportation Needs   In the past 12 months, has lack of transportation kept you from medical appointments or from getting medications? no   In the past 12 months, has lack of transportation kept you from meetings, work, or from getting things needed for daily living? No   Social Connections   In a typical week, how many times do you talk on the phone with family, friends, or neighbors? More than 3   How often do you get together with friends or relatives? More than 3   How often do you attend Yarsani or Buddhism services? Never   Do you belong to any clubs or organizations such as Yarsani groups, unions, fraternal or athletic groups, or school groups? No   How often do you attend meetings of the clubs or organizations you belong to? Never   Are you , , , , never , or living with a partner?    Alcohol Use   Q1: How often do you have a drink containing alcohol? Never   Q2: How many drinks containing alcohol do you have on a typical day when you are drinking? None   Q3: How often do you have six or more drinks on one occasion? Never   OTHER   Name(s) of People in Home Ross Finnegan ([phone])     Pt employed in [employer] at [employer]. Applied for STD while on leave of absence for recent Ortho procedure, and stated he is looking forward to returning to work. Pt resides with 19 yr old Ross finnegan -  from spouse. Pt denied any needs at this time.

## 2023-01-25 NOTE — PROGRESS NOTES
General Surgery Progress Note    Subjective:  NAEON. AFVSS. No complaints.   Patient reports having nausea yesterday, but it has completely went away.   Denies any abdominal pain, flank pain, blurry vision, dysphagia, or HA. No f/n/v/c. No longer feeling like food is stuck midway. Tolerating normal diet. No difficulty ambulating.     Objective:  Temp:  [98 °F (36.7 °C)-98.7 °F (37.1 °C)]   Pulse:  [65-89]   Resp:  [12-27]   BP: (113-260)/()   SpO2:  [94 %-100 %]     NAD  Equal chest rise b/l  RRR  Abd s/nt/nd, no guarding, no rebound tenderness    Intake/Output Summary (Last 24 hours) at 1/25/2023 0646  Last data filed at 1/25/2023 0502  Gross per 24 hour   Intake 1267.66 ml   Output 600 ml   Net 667.66 ml       Labs:  Phos Normal  Mag Normal  CMP: Cr (2.08 from 2.1 yesterday), AST/ALT (12/5)  CBC: WBC (6.2), H/H (13.2/41.1)    Assessment:  Mr. Durant is a 52 y.o. 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    Plan:  Recommend adjustments to pt's home BP meds as he states his BP is usually I SBP of 170 despite taking his BP meds.  Full dose aspirin therapy.  Lov 40 for DVT ppx.  Once achieve adequate blood pressure control  (SBP < 140), start patient on Plavix.  No surgical intervention recommend at this time, given no changes in physical exam and no increase in LFTs, surgery will sign off today.  Please call us with any acute changes in abdominal exam.    Mansi Chinchilla, MS3  [facility]    Note reviewed and edited as needed.     Jelly Madrigal M.D  [facility], PGY-1

## 2023-01-25 NOTE — ED NOTES
During assessment, patient states he is not having flank pain that he was earlier, but still nauseous. Patient is currently A&O x4, no difficulty breathing. Patient currently denies chest pain, abdominal pain, SOB, or weakness. Patient on cell phone during exam.

## 2023-01-25 NOTE — MEDICAL/APP STUDENT
General Surgery Progress Note    Subjective:  NAEON. AFVSS. No complaints.   Patient reports having nausea yesterday, but it has completely went away.   Denies any abdominal pain, flank pain, blurry vision, dysphagia, or HA. No f/n/v/c. No longer feeling like food is stuck midway. Tolerating normal diet. No difficulty ambulating.     Objective:  Temp:  [98 °F (36.7 °C)-98.7 °F (37.1 °C)]   Pulse:  [65-89]   Resp:  [12-27]   BP: (113-260)/()   SpO2:  [94 %-100 %]     NAD  Equal chest rise b/l  RRR  Abd s/nt/nd, no guarding, no rebound tenderness    Intake/Output Summary (Last 24 hours) at 1/25/2023 0603  Last data filed at 1/25/2023 0502  Gross per 24 hour   Intake 1267.66 ml   Output 600 ml   Net 667.66 ml     Labs:  Phos Normal  Mag Normal  CMP: Cr (2.08 from 2.1 yesterday), AST/ALT (12/5)  CBC: WBC (6.2), H/H (13.2/41.1)    Assessment:  Mr. Durant is a 52 y.o. 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    Plan:  Recommend adjustments to pt's home BP meds as he states his BP is usually I SBP of 170 despite taking his BP meds.  Full dose aspirin therapy.  Lov 40 for DVT ppx.  Once achieve adequate blood pressure control  (SBP < 140), start patient on Plavix.  No surgical intervention recommend at this time, given no changes in physical exam and no increase in LFTs, surgery will sign off today.  Please call us with any acute changes in abdominal exam.    Mansi Chinchilla, MS3  [facility]

## 2023-01-26 LAB
ALBUMIN SERPL-MCNC: 3.1 G/DL (ref 3.5–5)
ALBUMIN/GLOB SERPL: 0.9 RATIO (ref 1.1–2)
ALP SERPL-CCNC: 73 UNIT/L (ref 40–150)
ALT SERPL-CCNC: <5 UNIT/L (ref 0–55)
AST SERPL-CCNC: 10 UNIT/L (ref 5–34)
BASOPHILS # BLD AUTO: 0.04 X10(3)/MCL (ref 0–0.2)
BASOPHILS NFR BLD AUTO: 0.7 %
BILIRUBIN DIRECT+TOT PNL SERPL-MCNC: 0.4 MG/DL
BUN SERPL-MCNC: 19.7 MG/DL (ref 8.4–25.7)
C-ANCA TITR SER IF: NEGATIVE {TITER}
CALCIUM SERPL-MCNC: 8.8 MG/DL (ref 8.4–10.2)
CHLORIDE SERPL-SCNC: 109 MMOL/L (ref 98–107)
CO2 SERPL-SCNC: 23 MMOL/L (ref 22–29)
CREAT SERPL-MCNC: 2.03 MG/DL (ref 0.73–1.18)
DSDNA IGG SERPL IA-ACNC: <12.3 IU/ML
EOSINOPHIL # BLD AUTO: 0.26 X10(3)/MCL (ref 0–0.9)
EOSINOPHIL NFR BLD AUTO: 4.4 %
ERYTHROCYTE [DISTWIDTH] IN BLOOD BY AUTOMATED COUNT: 13.8 % (ref 11.5–17)
GFR SERPLBLD CREATININE-BSD FMLA CKD-EPI: 39 MLS/MIN/1.73/M2
GLOBULIN SER-MCNC: 3.5 GM/DL (ref 2.4–3.5)
GLUCOSE SERPL-MCNC: 92 MG/DL (ref 74–100)
HCT VFR BLD AUTO: 41.6 % (ref 42–52)
HGB BLD-MCNC: 13.3 GM/DL (ref 14–18)
IMM GRANULOCYTES # BLD AUTO: 0.01 X10(3)/MCL (ref 0–0.04)
IMM GRANULOCYTES NFR BLD AUTO: 0.2 %
LYMPHOCYTES # BLD AUTO: 1.37 X10(3)/MCL (ref 0.6–4.6)
LYMPHOCYTES NFR BLD AUTO: 23 %
MAGNESIUM SERPL-MCNC: 2 MG/DL (ref 1.6–2.6)
MCH RBC QN AUTO: 28.3 PG
MCHC RBC AUTO-ENTMCNC: 32 MG/DL (ref 33–36)
MCV RBC AUTO: 88.5 FL (ref 80–94)
MONOCYTES # BLD AUTO: 0.72 X10(3)/MCL (ref 0.1–1.3)
MONOCYTES NFR BLD AUTO: 12.1 %
NEUTROPHILS # BLD AUTO: 3.55 X10(3)/MCL (ref 2.1–9.2)
NEUTROPHILS NFR BLD AUTO: 59.6 %
NRBC BLD AUTO-RTO: 0 %
P-ANCA SER QL IF: NEGATIVE
PHOSPHATE SERPL-MCNC: 2.5 MG/DL (ref 2.3–4.7)
PLATELET # BLD AUTO: 146 X10(3)/MCL (ref 130–400)
PMV BLD AUTO: 11.4 FL (ref 7.4–10.4)
POTASSIUM SERPL-SCNC: 4.2 MMOL/L (ref 3.5–5.1)
PROT SERPL-MCNC: 6.6 GM/DL (ref 6.4–8.3)
RBC # BLD AUTO: 4.7 X10(6)/MCL (ref 4.7–6.1)
SODIUM SERPL-SCNC: 139 MMOL/L (ref 136–145)
WBC # SPEC AUTO: 6 X10(3)/MCL (ref 4.5–11.5)

## 2023-01-26 PROCEDURE — 25000003 PHARM REV CODE 250: Performed by: INTERNAL MEDICINE

## 2023-01-26 PROCEDURE — 21400001 HC TELEMETRY ROOM

## 2023-01-26 PROCEDURE — 84100 ASSAY OF PHOSPHORUS: CPT | Performed by: INTERNAL MEDICINE

## 2023-01-26 PROCEDURE — 25000003 PHARM REV CODE 250: Performed by: STUDENT IN AN ORGANIZED HEALTH CARE EDUCATION/TRAINING PROGRAM

## 2023-01-26 PROCEDURE — 83735 ASSAY OF MAGNESIUM: CPT | Performed by: INTERNAL MEDICINE

## 2023-01-26 PROCEDURE — 85025 COMPLETE CBC W/AUTO DIFF WBC: CPT | Performed by: INTERNAL MEDICINE

## 2023-01-26 PROCEDURE — 80053 COMPREHEN METABOLIC PANEL: CPT | Performed by: INTERNAL MEDICINE

## 2023-01-26 PROCEDURE — 63600175 PHARM REV CODE 636 W HCPCS: Performed by: INTERNAL MEDICINE

## 2023-01-26 PROCEDURE — 63600175 PHARM REV CODE 636 W HCPCS: Performed by: STUDENT IN AN ORGANIZED HEALTH CARE EDUCATION/TRAINING PROGRAM

## 2023-01-26 RX ORDER — LABETALOL 100 MG/1
100 TABLET, FILM COATED ORAL ONCE
Status: COMPLETED | OUTPATIENT
Start: 2023-01-26 | End: 2023-01-26

## 2023-01-26 RX ORDER — MUPIROCIN 20 MG/G
OINTMENT TOPICAL 2 TIMES DAILY
Status: DISCONTINUED | OUTPATIENT
Start: 2023-01-26 | End: 2023-01-28 | Stop reason: HOSPADM

## 2023-01-26 RX ORDER — HYDRALAZINE HYDROCHLORIDE 20 MG/ML
10 INJECTION INTRAMUSCULAR; INTRAVENOUS EVERY 4 HOURS PRN
Status: DISCONTINUED | OUTPATIENT
Start: 2023-01-26 | End: 2023-01-28 | Stop reason: HOSPADM

## 2023-01-26 RX ORDER — SODIUM CHLORIDE 9 MG/ML
INJECTION, SOLUTION INTRAVENOUS CONTINUOUS
Status: DISCONTINUED | OUTPATIENT
Start: 2023-01-26 | End: 2023-01-28

## 2023-01-26 RX ORDER — ASPIRIN 325 MG
325 TABLET ORAL DAILY
Status: DISCONTINUED | OUTPATIENT
Start: 2023-01-27 | End: 2023-01-28 | Stop reason: HOSPADM

## 2023-01-26 RX ORDER — LABETALOL HCL 20 MG/4 ML
10 SYRINGE (ML) INTRAVENOUS EVERY 4 HOURS PRN
Status: DISCONTINUED | OUTPATIENT
Start: 2023-01-26 | End: 2023-01-28 | Stop reason: HOSPADM

## 2023-01-26 RX ORDER — LABETALOL 100 MG/1
200 TABLET, FILM COATED ORAL EVERY 12 HOURS
Status: DISCONTINUED | OUTPATIENT
Start: 2023-01-26 | End: 2023-01-28 | Stop reason: HOSPADM

## 2023-01-26 RX ADMIN — FAMOTIDINE 20 MG: 20 TABLET, FILM COATED ORAL at 09:01

## 2023-01-26 RX ADMIN — SODIUM CHLORIDE: 9 INJECTION, SOLUTION INTRAVENOUS at 04:01

## 2023-01-26 RX ADMIN — LABETALOL HYDROCHLORIDE 10 MG: 5 INJECTION, SOLUTION INTRAVENOUS at 05:01

## 2023-01-26 RX ADMIN — NIFEDIPINE 90 MG: 30 TABLET, FILM COATED, EXTENDED RELEASE ORAL at 09:01

## 2023-01-26 RX ADMIN — LABETALOL HYDROCHLORIDE 200 MG: 100 TABLET, FILM COATED ORAL at 09:01

## 2023-01-26 RX ADMIN — ENOXAPARIN SODIUM 40 MG: 40 INJECTION SUBCUTANEOUS at 05:01

## 2023-01-26 RX ADMIN — ASPIRIN 81 MG CHEWABLE TABLET 81 MG: 81 TABLET CHEWABLE at 09:01

## 2023-01-26 RX ADMIN — LABETALOL HYDROCHLORIDE 100 MG: 100 TABLET, FILM COATED ORAL at 01:01

## 2023-01-26 RX ADMIN — MUPIROCIN: 20 OINTMENT TOPICAL at 09:01

## 2023-01-26 RX ADMIN — LABETALOL HYDROCHLORIDE 100 MG: 100 TABLET, FILM COATED ORAL at 09:01

## 2023-01-26 RX ADMIN — HYDRALAZINE HYDROCHLORIDE 10 MG: 20 INJECTION INTRAMUSCULAR; INTRAVENOUS at 09:01

## 2023-01-26 NOTE — PROGRESS NOTES
[facility] - Internal Medicine    Patient Name: Osito Durant  MRN: [id number]  Admission Date: 1/24/2023  Hospital Length of Stay: 2 days  Code Status: Full Code  Attending Provider: Rohan Ray MD  Primary Care Provider: Primary Doctor No     Subjective:     HPI:   52-year-old  male with past medical history of hypertension who presents the emergency department with hypertensive emergency.  Patient was being seen in [facility] but was sent to the emergency department after blood pressure noted to be significantly elevated.  Blood pressure initially in emergency department was 223/129 with the highest recorded at 260/129.  Patient states that she has a history of hypertension and has been on lisinopril 10 and nifedipine 60.  Despite taking medications blood pressure persistently elevated with systolic reportedly 160-170.  For the past 3 days he has not been able take his medication after running out and this is when he began to have abdominal fullness and bilateral flank pain.  He states that whenever he eats his abdomen gets distended and has worsening flank/kidney pain.  Associated with a high blood pressure he also complaints of dizziness, presyncope, headache, and blurry vision.  At this time he states that dizziness, presyncope, and headache have resolved but continues to have some blurry vision.  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  No evidence of renal artery dissection or stenosis.  Incidental findings of ankylosing spondylitis of the spine and sacroiliac joints.    Hospital Course/Significant events:      24 Hour Interval History:  No acute events overnight. Pt has no complaints. Denies CP, headaches, changes in vision. Reports concern for his BP elevations after weight loss and healthy diet.    Current Outpatient Medications   Medication Instructions    lisinopriL 10 MG tablet 1  tablet, Oral, Daily    NIFEdipine (PROCARDIA-XL) 60 mg, Oral, Daily       Current Inpatient Medications   [START ON 1/27/2023] aspirin  325 mg Oral Daily    enoxaparin  40 mg Subcutaneous Daily    famotidine  20 mg Oral BID    labetaloL  200 mg Oral Q12H    mupirocin   Nasal BID    NIFEdipine  90 mg Oral Daily       Current Intravenous Infusions   sodium chloride 0.9% 100 mL/hr at 01/26/23 1610         Review of Systems   Constitutional:  Negative for chills and fever.   Eyes:  Negative for blurred vision, double vision and photophobia.   Respiratory:  Negative for cough and shortness of breath.    Cardiovascular:  Negative for chest pain, palpitations and leg swelling.   Gastrointestinal:  Negative for abdominal pain, nausea and vomiting.   Genitourinary:  Negative for dysuria, flank pain, frequency and hematuria.   Musculoskeletal:  Negative for neck pain.   Neurological:  Negative for dizziness and headaches.        Objective:       Intake/Output Summary (Last 24 hours) at 1/26/2023 1652  Last data filed at 1/26/2023 0508  Gross per 24 hour   Intake 100 ml   Output --   Net 100 ml         Vital Signs (Most Recent):  Temp: 98.6 °F (37 °C) (01/26/23 1145)  Pulse: 71 (01/26/23 1330)  Resp: (!) 21 (01/26/23 1330)  BP: (!) 167/98 (01/26/23 1300)  SpO2: 98 % (01/26/23 1300)    Body mass index is 27.81 kg/m².  Weight: 93 kg (205 lb 0.4 oz) Vital Signs (24h Range):  Temp:  [97.7 °F (36.5 °C)-98.6 °F (37 °C)] 98.6 °F (37 °C)  Pulse:  [59-88] 71  Resp:  [13-29] 21  SpO2:  [94 %-98 %] 98 %  BP: (149-175)/() 167/98     Physical Exam  Vitals and nursing note reviewed.   Constitutional:       General: He is not in acute distress.     Appearance: Normal appearance. He is normal weight. He is not ill-appearing or toxic-appearing.   HENT:      Head: Normocephalic and atraumatic.      Mouth/Throat:      Mouth: Mucous membranes are moist.      Pharynx: Oropharynx is clear.   Eyes:      General: No scleral icterus.         Right eye: No discharge.         Left eye: No discharge.      Extraocular Movements: Extraocular movements intact.      Conjunctiva/sclera: Conjunctivae normal.      Pupils: Pupils are equal, round, and reactive to light.   Cardiovascular:      Rate and Rhythm: Normal rate and regular rhythm.      Pulses: Normal pulses.      Heart sounds: Normal heart sounds. No murmur heard.    No gallop.   Pulmonary:      Effort: Pulmonary effort is normal. No respiratory distress.      Breath sounds: Normal breath sounds. No stridor. No wheezing, rhonchi or rales.   Abdominal:      General: Bowel sounds are normal. There is no distension.      Palpations: Abdomen is soft.      Tenderness: There is no abdominal tenderness. There is no guarding or rebound.   Musculoskeletal:         General: No swelling.      Right lower leg: No edema.      Left lower leg: No edema.   Skin:     General: Skin is warm and dry.   Neurological:      General: No focal deficit present.      Mental Status: He is alert and oriented to person, place, and time.   Psychiatric:         Mood and Affect: Mood normal.         Behavior: Behavior normal.         Lines/Drains/Airways       Peripheral Intravenous Line  Duration                  Peripheral IV - Single Lumen 01/24/23 1057 20 G Anterior;Right Wrist 2 days         Peripheral IV - Single Lumen 01/24/23 1438 20 G Left Antecubital 2 days                    Significant Labs:    Lab Results   Component Value Date    WBC 6.0 01/26/2023    HGB 13.3 (L) 01/26/2023    HCT 41.6 (L) 01/26/2023    MCV 88.5 01/26/2023     01/26/2023         BMP  Lab Results   Component Value Date     01/26/2023    K 4.2 01/26/2023    CHLORIDE 109 (H) 01/26/2023    CO2 23 01/26/2023    BUN 19.7 01/26/2023    CREATININE 2.03 (H) 01/26/2023    CALCIUM 8.8 01/26/2023    EGFRNONAA 54 (L) 10/25/2017       ABG  No results for input(s): PH, PO2, PCO2, HCO3, BE in the last 168 hours.    Mechanical Ventilation Support:        Significant Imaging:  I have reviewed the pertinent imaging within the past 24 hours.        Assessment/Plan:     Assessment  Celiac artery dissection  Hypertensive emergency  ANA PAULA - last creatinine 5 years ago 1.5      Plan  - Lovenox 40 mg and  mg per surgery recommendations  - plan to start Plavix when 's  - Increase labetalol to 200 mg BID, continue nifedipine 90 mg. Pt pressure uncontrolled on amlodipine and lisinopril previously.  - Aldosterone, renin, plasma metanephrines, ANDREI, ANCA, Anti DNA, Anti scleroderma  - NS @ 100 mL/hr plan to d/c.        DVT Prophylaxis:Lovenox  GI Prophylaxis:famotidine       Blanca Rosales M.D.  [facility] HO-2

## 2023-01-27 LAB
ALBUMIN SERPL-MCNC: 3.1 G/DL (ref 3.5–5)
ALBUMIN/GLOB SERPL: 0.9 RATIO (ref 1.1–2)
ALDOST SERPL-MCNC: 5.5 NG/DL
ALP SERPL-CCNC: 73 UNIT/L (ref 40–150)
ALT SERPL-CCNC: 6 UNIT/L (ref 0–55)
AST SERPL-CCNC: 11 UNIT/L (ref 5–34)
BASOPHILS # BLD AUTO: 0.04 X10(3)/MCL (ref 0–0.2)
BASOPHILS NFR BLD AUTO: 0.7 %
BILIRUBIN DIRECT+TOT PNL SERPL-MCNC: 0.4 MG/DL
BUN SERPL-MCNC: 23.2 MG/DL (ref 8.4–25.7)
CALCIUM SERPL-MCNC: 8.9 MG/DL (ref 8.4–10.2)
CHLORIDE SERPL-SCNC: 108 MMOL/L (ref 98–107)
CO2 SERPL-SCNC: 23 MMOL/L (ref 22–29)
CREAT SERPL-MCNC: 1.81 MG/DL (ref 0.73–1.18)
CREAT UR-MCNC: 74.2 MG/DL (ref 63–166)
ENA SCL70 IGG SER IA-ACNC: 4 AU/ML
EOSINOPHIL # BLD AUTO: 0.37 X10(3)/MCL (ref 0–0.9)
EOSINOPHIL NFR BLD AUTO: 6.6 %
ERYTHROCYTE [DISTWIDTH] IN BLOOD BY AUTOMATED COUNT: 13.7 % (ref 11.5–17)
GFR SERPLBLD CREATININE-BSD FMLA CKD-EPI: 45 MLS/MIN/1.73/M2
GLOBULIN SER-MCNC: 3.6 GM/DL (ref 2.4–3.5)
GLUCOSE SERPL-MCNC: 88 MG/DL (ref 74–100)
HCT VFR BLD AUTO: 41.4 % (ref 42–52)
HGB BLD-MCNC: 13.2 GM/DL (ref 14–18)
IMM GRANULOCYTES # BLD AUTO: 0.01 X10(3)/MCL (ref 0–0.04)
IMM GRANULOCYTES NFR BLD AUTO: 0.2 %
LYMPHOCYTES # BLD AUTO: 1.28 X10(3)/MCL (ref 0.6–4.6)
LYMPHOCYTES NFR BLD AUTO: 22.7 %
MAGNESIUM SERPL-MCNC: 1.9 MG/DL (ref 1.6–2.6)
MCH RBC QN AUTO: 27.8 PG
MCHC RBC AUTO-ENTMCNC: 31.9 MG/DL (ref 33–36)
MCV RBC AUTO: 87.3 FL (ref 80–94)
MONOCYTES # BLD AUTO: 0.62 X10(3)/MCL (ref 0.1–1.3)
MONOCYTES NFR BLD AUTO: 11 %
NEUTROPHILS # BLD AUTO: 3.31 X10(3)/MCL (ref 2.1–9.2)
NEUTROPHILS NFR BLD AUTO: 58.8 %
NRBC BLD AUTO-RTO: 0 %
PHOSPHATE SERPL-MCNC: 2.1 MG/DL (ref 2.3–4.7)
PLATELET # BLD AUTO: 154 X10(3)/MCL (ref 130–400)
PMV BLD AUTO: 11.5 FL (ref 7.4–10.4)
POTASSIUM SERPL-SCNC: 3.9 MMOL/L (ref 3.5–5.1)
PROT SERPL-MCNC: 6.7 GM/DL (ref 6.4–8.3)
PROT UR STRIP-MCNC: 18.2 MG/DL
RBC # BLD AUTO: 4.74 X10(6)/MCL (ref 4.7–6.1)
SODIUM SERPL-SCNC: 139 MMOL/L (ref 136–145)
URINE PROTEIN/CREATININE RATIO (OHS): 0.2
WBC # SPEC AUTO: 5.6 X10(3)/MCL (ref 4.5–11.5)

## 2023-01-27 PROCEDURE — 83735 ASSAY OF MAGNESIUM: CPT | Performed by: INTERNAL MEDICINE

## 2023-01-27 PROCEDURE — 84156 ASSAY OF PROTEIN URINE: CPT

## 2023-01-27 PROCEDURE — 63600175 PHARM REV CODE 636 W HCPCS: Performed by: INTERNAL MEDICINE

## 2023-01-27 PROCEDURE — 80053 COMPREHEN METABOLIC PANEL: CPT | Performed by: INTERNAL MEDICINE

## 2023-01-27 PROCEDURE — 25000003 PHARM REV CODE 250: Performed by: INTERNAL MEDICINE

## 2023-01-27 PROCEDURE — 85025 COMPLETE CBC W/AUTO DIFF WBC: CPT | Performed by: INTERNAL MEDICINE

## 2023-01-27 PROCEDURE — 84100 ASSAY OF PHOSPHORUS: CPT | Performed by: INTERNAL MEDICINE

## 2023-01-27 PROCEDURE — 25000003 PHARM REV CODE 250: Performed by: STUDENT IN AN ORGANIZED HEALTH CARE EDUCATION/TRAINING PROGRAM

## 2023-01-27 PROCEDURE — 21400001 HC TELEMETRY ROOM

## 2023-01-27 RX ORDER — HYDROCHLOROTHIAZIDE 25 MG/1
50 TABLET ORAL DAILY
Status: DISCONTINUED | OUTPATIENT
Start: 2023-01-27 | End: 2023-01-28 | Stop reason: HOSPADM

## 2023-01-27 RX ORDER — HYDRALAZINE HYDROCHLORIDE 25 MG/1
50 TABLET, FILM COATED ORAL EVERY 8 HOURS
Status: DISCONTINUED | OUTPATIENT
Start: 2023-01-27 | End: 2023-01-27

## 2023-01-27 RX ORDER — HYDROCHLOROTHIAZIDE 25 MG/1
25 TABLET ORAL DAILY
Status: DISCONTINUED | OUTPATIENT
Start: 2023-01-27 | End: 2023-01-27

## 2023-01-27 RX ORDER — HYDRALAZINE HYDROCHLORIDE 25 MG/1
75 TABLET, FILM COATED ORAL EVERY 8 HOURS
Status: DISCONTINUED | OUTPATIENT
Start: 2023-01-27 | End: 2023-01-28

## 2023-01-27 RX ADMIN — SODIUM CHLORIDE: 9 INJECTION, SOLUTION INTRAVENOUS at 11:01

## 2023-01-27 RX ADMIN — HYDRALAZINE HYDROCHLORIDE 75 MG: 25 TABLET ORAL at 09:01

## 2023-01-27 RX ADMIN — LABETALOL HYDROCHLORIDE 200 MG: 100 TABLET, FILM COATED ORAL at 09:01

## 2023-01-27 RX ADMIN — SODIUM CHLORIDE: 9 INJECTION, SOLUTION INTRAVENOUS at 07:01

## 2023-01-27 RX ADMIN — HYDROCHLOROTHIAZIDE 50 MG: 25 TABLET ORAL at 06:01

## 2023-01-27 RX ADMIN — HYDRALAZINE HYDROCHLORIDE 50 MG: 25 TABLET ORAL at 10:01

## 2023-01-27 RX ADMIN — FAMOTIDINE 20 MG: 20 TABLET, FILM COATED ORAL at 09:01

## 2023-01-27 RX ADMIN — ENOXAPARIN SODIUM 40 MG: 40 INJECTION SUBCUTANEOUS at 04:01

## 2023-01-27 RX ADMIN — MUPIROCIN: 20 OINTMENT TOPICAL at 09:01

## 2023-01-27 RX ADMIN — ASPIRIN 325 MG ORAL TABLET 325 MG: 325 PILL ORAL at 09:01

## 2023-01-27 RX ADMIN — LABETALOL HYDROCHLORIDE 200 MG: 100 TABLET, FILM COATED ORAL at 08:01

## 2023-01-27 RX ADMIN — MUPIROCIN: 20 OINTMENT TOPICAL at 08:01

## 2023-01-27 RX ADMIN — SODIUM CHLORIDE: 9 INJECTION, SOLUTION INTRAVENOUS at 01:01

## 2023-01-27 RX ADMIN — NIFEDIPINE 90 MG: 30 TABLET, FILM COATED, EXTENDED RELEASE ORAL at 08:01

## 2023-01-27 RX ADMIN — FAMOTIDINE 20 MG: 20 TABLET, FILM COATED ORAL at 08:01

## 2023-01-27 NOTE — PROGRESS NOTES
[facility] - Internal Medicine    Patient Name: Osito Durant  MRN: [id number]  Admission Date: 1/24/2023  Hospital Length of Stay: 3 days  Code Status: Full Code  Attending Provider: Rohan Ray MD  Primary Care Provider: Primary Doctor No     Subjective:     HPI:   52-year-old  male with past medical history of hypertension who presents the emergency department with hypertensive emergency.  Patient was being seen in [facility] but was sent to the emergency department after blood pressure noted to be significantly elevated.  Blood pressure initially in emergency department was 223/129 with the highest recorded at 260/129.  Patient states that she has a history of hypertension and has been on lisinopril 10 and nifedipine 60.  Despite taking medications blood pressure persistently elevated with systolic reportedly 160-170.  For the past 3 days he has not been able take his medication after running out and this is when he began to have abdominal fullness and bilateral flank pain.  He states that whenever he eats his abdomen gets distended and has worsening flank/kidney pain.  Associated with a high blood pressure he also complaints of dizziness, presyncope, headache, and blurry vision.  At this time he states that dizziness, presyncope, and headache have resolved but continues to have some blurry vision.  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  No evidence of renal artery dissection or stenosis.  Incidental findings of ankylosing spondylitis of the spine and sacroiliac joints.    Hospital Course/Significant events:      24 Hour Interval History:  No acute events overnight. Depite increase in labetalol blood pressure remains elevated. Otherwise he is afebrile and other vitals stable. Awake, alert, oriented. No acute complaints this morning.     Current Outpatient Medications   Medication Instructions     lisinopriL 10 MG tablet 1 tablet, Oral, Daily    NIFEdipine (PROCARDIA-XL) 60 mg, Oral, Daily       Current Inpatient Medications   aspirin  325 mg Oral Daily    enoxaparin  40 mg Subcutaneous Daily    famotidine  20 mg Oral BID    hydroCHLOROthiazide  50 mg Oral Daily    labetaloL  200 mg Oral Q12H    mupirocin   Nasal BID    NIFEdipine  90 mg Oral Daily       Current Intravenous Infusions   sodium chloride 0.9% 100 mL/hr at 01/27/23 0159         Review of Systems   Constitutional:  Negative for chills and fever.   Eyes:  Negative for blurred vision, double vision and photophobia.   Respiratory:  Negative for cough and shortness of breath.    Cardiovascular:  Negative for chest pain, palpitations and leg swelling.   Gastrointestinal:  Negative for abdominal pain, nausea and vomiting.   Genitourinary:  Negative for dysuria, flank pain, frequency and hematuria.   Musculoskeletal:  Negative for neck pain.   Neurological:  Negative for dizziness and headaches.        Objective:     No intake or output data in the 24 hours ending 01/27/23 0537        Vital Signs (Most Recent):  Temp: 97.8 °F (36.6 °C) (01/27/23 0330)  Pulse: 69 (01/27/23 0400)  Resp: 10 (01/27/23 0400)  BP: (!) 147/85 (01/27/23 0400)  SpO2: 99 % (01/27/23 0400)    Body mass index is 27.81 kg/m².  Weight: 93 kg (205 lb 0.4 oz) Vital Signs (24h Range):  Temp:  [97.7 °F (36.5 °C)-98.6 °F (37 °C)] 97.8 °F (36.6 °C)  Pulse:  [59-93] 69  Resp:  [7-29] 10  SpO2:  [94 %-99 %] 99 %  BP: (127-189)/() 147/85     Physical Exam  Vitals and nursing note reviewed.   Constitutional:       General: He is not in acute distress.     Appearance: Normal appearance. He is normal weight. He is not ill-appearing or toxic-appearing.   HENT:      Head: Normocephalic and atraumatic.      Mouth/Throat:      Mouth: Mucous membranes are moist.      Pharynx: Oropharynx is clear.   Eyes:      General: No scleral icterus.        Right eye: No discharge.         Left eye: No  discharge.      Extraocular Movements: Extraocular movements intact.      Conjunctiva/sclera: Conjunctivae normal.      Pupils: Pupils are equal, round, and reactive to light.   Cardiovascular:      Rate and Rhythm: Normal rate and regular rhythm.      Pulses: Normal pulses.      Heart sounds: Normal heart sounds. No murmur heard.    No gallop.   Pulmonary:      Effort: Pulmonary effort is normal. No respiratory distress.      Breath sounds: Normal breath sounds. No stridor. No wheezing, rhonchi or rales.   Abdominal:      General: Bowel sounds are normal. There is no distension.      Palpations: Abdomen is soft.      Tenderness: There is no abdominal tenderness. There is no guarding or rebound.   Musculoskeletal:         General: No swelling.      Right lower leg: No edema.      Left lower leg: No edema.   Skin:     General: Skin is warm and dry.   Neurological:      General: No focal deficit present.      Mental Status: He is alert and oriented to person, place, and time.   Psychiatric:         Mood and Affect: Mood normal.         Behavior: Behavior normal.         Lines/Drains/Airways       Peripheral Intravenous Line  Duration                  Peripheral IV - Single Lumen 01/24/23 1438 20 G Left Antecubital 2 days                    Significant Labs:    Lab Results   Component Value Date    WBC 5.6 01/27/2023    HGB 13.2 (L) 01/27/2023    HCT 41.4 (L) 01/27/2023    MCV 87.3 01/27/2023     01/27/2023         BMP  Lab Results   Component Value Date     01/27/2023    K 3.9 01/27/2023    CHLORIDE 108 (H) 01/27/2023    CO2 23 01/27/2023    BUN 23.2 01/27/2023    CREATININE 1.81 (H) 01/27/2023    CALCIUM 8.9 01/27/2023    EGFRNONAA 54 (L) 10/25/2017       ABG  No results for input(s): PH, PO2, PCO2, HCO3, BE in the last 168 hours.    Mechanical Ventilation Support:       Significant Imaging:  I have reviewed the pertinent imaging within the past 24 hours.        Assessment/Plan:     Assessment  Celiac  artery dissection  Hypertensive emergency  ANA PAULA - last creatinine 5 years ago 1.5      Plan  - Continue ASA. Will add plavix when BP is <140 systolic  - Continue Labetalol 200 and Nifedipine 90. Adding HCTZ 50 daily  - Aldosterone, renin, plasma metanephrines, ANDREI, ANCA, Anti DNA, Anti scleroderma pending  - NS @ 100 mL/hr plan to d/c when Cr <1.5    If blood pressure is better controlled this afternoon he may discharge otherwise will keep overnight to optimize medications further. At discharge he will also need follow up with vascular clinic and repeat CTA abd/pelvis in 1 month.     DVT Prophylaxis:Lovenox  GI Prophylaxis:famotidine     KARYN CARMONA DO  [facility]

## 2023-01-28 VITALS
HEART RATE: 90 BPM | SYSTOLIC BLOOD PRESSURE: 141 MMHG | TEMPERATURE: 97 F | DIASTOLIC BLOOD PRESSURE: 82 MMHG | RESPIRATION RATE: 20 BRPM | HEIGHT: 72 IN | BODY MASS INDEX: 27.77 KG/M2 | OXYGEN SATURATION: 99 % | WEIGHT: 205 LBS

## 2023-01-28 PROBLEM — I16.1 HYPERTENSIVE EMERGENCY: Status: RESOLVED | Noted: 2023-01-24 | Resolved: 2023-01-28

## 2023-01-28 LAB
ALBUMIN SERPL-MCNC: 3.2 G/DL (ref 3.5–5)
ALBUMIN/GLOB SERPL: 0.8 RATIO (ref 1.1–2)
ALP SERPL-CCNC: 69 UNIT/L (ref 40–150)
ALT SERPL-CCNC: 7 UNIT/L (ref 0–55)
ANNOTATION COMMENT IMP: NORMAL
AST SERPL-CCNC: 15 UNIT/L (ref 5–34)
BASOPHILS # BLD AUTO: 0.05 X10(3)/MCL (ref 0–0.2)
BASOPHILS NFR BLD AUTO: 1 %
BILIRUBIN DIRECT+TOT PNL SERPL-MCNC: 0.5 MG/DL
BUN SERPL-MCNC: 21 MG/DL (ref 8.4–25.7)
CALCIUM SERPL-MCNC: 9.3 MG/DL (ref 8.4–10.2)
CHLORIDE SERPL-SCNC: 107 MMOL/L (ref 98–107)
CO2 SERPL-SCNC: 21 MMOL/L (ref 22–29)
CREAT SERPL-MCNC: 1.8 MG/DL (ref 0.73–1.18)
EOSINOPHIL # BLD AUTO: 0.36 X10(3)/MCL (ref 0–0.9)
EOSINOPHIL NFR BLD AUTO: 7.1 %
ERYTHROCYTE [DISTWIDTH] IN BLOOD BY AUTOMATED COUNT: 14 % (ref 11.5–17)
GFR SERPLBLD CREATININE-BSD FMLA CKD-EPI: 45 MLS/MIN/1.73/M2
GLOBULIN SER-MCNC: 4 GM/DL (ref 2.4–3.5)
GLUCOSE SERPL-MCNC: 90 MG/DL (ref 74–100)
HCT VFR BLD AUTO: 41.7 % (ref 42–52)
HGB BLD-MCNC: 13.5 GM/DL (ref 14–18)
IMM GRANULOCYTES # BLD AUTO: 0.02 X10(3)/MCL (ref 0–0.04)
IMM GRANULOCYTES NFR BLD AUTO: 0.4 %
LYMPHOCYTES # BLD AUTO: 1.29 X10(3)/MCL (ref 0.6–4.6)
LYMPHOCYTES NFR BLD AUTO: 25.3 %
MAGNESIUM SERPL-MCNC: 1.9 MG/DL (ref 1.6–2.6)
MCH RBC QN AUTO: 28.1 PG
MCHC RBC AUTO-ENTMCNC: 32.4 MG/DL (ref 33–36)
MCV RBC AUTO: 86.7 FL (ref 80–94)
METANEPHS SERPL-SCNC: 0.27 NMOL/L
MONOCYTES # BLD AUTO: 0.56 X10(3)/MCL (ref 0.1–1.3)
MONOCYTES NFR BLD AUTO: 11 %
NEUTROPHILS # BLD AUTO: 2.81 X10(3)/MCL (ref 2.1–9.2)
NEUTROPHILS NFR BLD AUTO: 55.2 %
NORMETANEPHRINE SERPL-SCNC: 0.46 NMOL/L
NRBC BLD AUTO-RTO: 0 %
PHOSPHATE SERPL-MCNC: 2.9 MG/DL (ref 2.3–4.7)
PLATELET # BLD AUTO: 151 X10(3)/MCL (ref 130–400)
PMV BLD AUTO: 10.6 FL (ref 7.4–10.4)
POTASSIUM SERPL-SCNC: 4.2 MMOL/L (ref 3.5–5.1)
PROT SERPL-MCNC: 7.2 GM/DL (ref 6.4–8.3)
RBC # BLD AUTO: 4.81 X10(6)/MCL (ref 4.7–6.1)
SODIUM SERPL-SCNC: 137 MMOL/L (ref 136–145)
WBC # SPEC AUTO: 5.1 X10(3)/MCL (ref 4.5–11.5)

## 2023-01-28 PROCEDURE — 85025 COMPLETE CBC W/AUTO DIFF WBC: CPT | Performed by: INTERNAL MEDICINE

## 2023-01-28 PROCEDURE — 25000003 PHARM REV CODE 250: Performed by: INTERNAL MEDICINE

## 2023-01-28 PROCEDURE — 25000003 PHARM REV CODE 250

## 2023-01-28 PROCEDURE — 25000003 PHARM REV CODE 250: Performed by: STUDENT IN AN ORGANIZED HEALTH CARE EDUCATION/TRAINING PROGRAM

## 2023-01-28 PROCEDURE — 80053 COMPREHEN METABOLIC PANEL: CPT | Performed by: INTERNAL MEDICINE

## 2023-01-28 PROCEDURE — 83735 ASSAY OF MAGNESIUM: CPT | Performed by: INTERNAL MEDICINE

## 2023-01-28 PROCEDURE — 84100 ASSAY OF PHOSPHORUS: CPT | Performed by: INTERNAL MEDICINE

## 2023-01-28 RX ORDER — HYDRALAZINE HYDROCHLORIDE 25 MG/1
100 TABLET, FILM COATED ORAL EVERY 8 HOURS
Status: DISCONTINUED | OUTPATIENT
Start: 2023-01-28 | End: 2023-01-28 | Stop reason: HOSPADM

## 2023-01-28 RX ORDER — HYDRALAZINE HYDROCHLORIDE 50 MG/1
100 TABLET, FILM COATED ORAL EVERY 8 HOURS
Qty: 180 TABLET | Refills: 11 | Status: SHIPPED | OUTPATIENT
Start: 2023-01-28 | End: 2024-01-28

## 2023-01-28 RX ORDER — HYDRALAZINE HYDROCHLORIDE 25 MG/1
25 TABLET, FILM COATED ORAL ONCE
Status: COMPLETED | OUTPATIENT
Start: 2023-01-28 | End: 2023-01-28

## 2023-01-28 RX ORDER — HYDRALAZINE HYDROCHLORIDE 50 MG/1
75 TABLET, FILM COATED ORAL EVERY 8 HOURS
Qty: 135 TABLET | Refills: 11 | Status: SHIPPED | OUTPATIENT
Start: 2023-01-28 | End: 2023-01-28 | Stop reason: SDUPTHER

## 2023-01-28 RX ORDER — CLOPIDOGREL BISULFATE 75 MG/1
75 TABLET ORAL DAILY
Status: DISCONTINUED | OUTPATIENT
Start: 2023-01-28 | End: 2023-01-28 | Stop reason: HOSPADM

## 2023-01-28 RX ORDER — NIFEDIPINE 90 MG/1
90 TABLET, EXTENDED RELEASE ORAL DAILY
Qty: 30 TABLET | Refills: 11 | Status: SHIPPED | OUTPATIENT
Start: 2023-01-28 | End: 2024-03-05 | Stop reason: SDUPTHER

## 2023-01-28 RX ORDER — CLOPIDOGREL BISULFATE 75 MG/1
75 TABLET ORAL DAILY
Qty: 30 TABLET | Refills: 5 | Status: SHIPPED | OUTPATIENT
Start: 2023-01-28 | End: 2024-01-28

## 2023-01-28 RX ORDER — ACETAMINOPHEN 500 MG
1000 TABLET ORAL ONCE
Status: COMPLETED | OUTPATIENT
Start: 2023-01-28 | End: 2023-01-28

## 2023-01-28 RX ORDER — ASPIRIN 81 MG/1
81 TABLET ORAL DAILY
Qty: 30 TABLET | Refills: 4 | Status: SHIPPED | OUTPATIENT
Start: 2023-01-28 | End: 2024-01-28

## 2023-01-28 RX ORDER — LABETALOL 200 MG/1
200 TABLET, FILM COATED ORAL EVERY 12 HOURS
Qty: 60 TABLET | Refills: 11 | Status: SHIPPED | OUTPATIENT
Start: 2023-01-28 | End: 2024-01-28

## 2023-01-28 RX ORDER — HYDROCHLOROTHIAZIDE 50 MG/1
50 TABLET ORAL DAILY
Qty: 30 TABLET | Refills: 11 | Status: SHIPPED | OUTPATIENT
Start: 2023-01-28 | End: 2024-01-28

## 2023-01-28 RX ADMIN — HYDRALAZINE HYDROCHLORIDE 75 MG: 25 TABLET ORAL at 05:01

## 2023-01-28 RX ADMIN — LABETALOL HYDROCHLORIDE 200 MG: 100 TABLET, FILM COATED ORAL at 08:01

## 2023-01-28 RX ADMIN — CLOPIDOGREL BISULFATE 75 MG: 75 TABLET ORAL at 08:01

## 2023-01-28 RX ADMIN — HYDRALAZINE HYDROCHLORIDE 25 MG: 25 TABLET ORAL at 06:01

## 2023-01-28 RX ADMIN — MUPIROCIN: 20 OINTMENT TOPICAL at 08:01

## 2023-01-28 RX ADMIN — ASPIRIN 325 MG ORAL TABLET 325 MG: 325 PILL ORAL at 09:01

## 2023-01-28 RX ADMIN — NIFEDIPINE 90 MG: 30 TABLET, FILM COATED, EXTENDED RELEASE ORAL at 08:01

## 2023-01-28 RX ADMIN — HYDROCHLOROTHIAZIDE 50 MG: 25 TABLET ORAL at 08:01

## 2023-01-28 RX ADMIN — FAMOTIDINE 20 MG: 20 TABLET, FILM COATED ORAL at 08:01

## 2023-01-28 RX ADMIN — ACETAMINOPHEN 1000 MG: 500 TABLET ORAL at 10:01

## 2023-01-28 NOTE — DISCHARGE SUMMARY
[facility] Discharge Summary    Admitting Physician: Rohan Ray MD  Attending Physician: Rohan Ray MD  Date of Admit: 1/24/2023  Date of Discharge: 1/28/2023    Condition: Good  Outcome: Condition has improved and patient is now ready for discharge.  DISPOSITION: Home or Self Care    Discharge Diagnoses     Patient Active Problem List   Diagnosis    Primary hypertension    Celiac artery dissection       Principal Problem:  Celiac artery dissection    Consultants and Procedures     Consultants:  Consults (From admission, onward)          Status Ordering Provider     Inpatient consult to Vascular Surgery  Once        Provider:  Hernando Eckert MD    Acknowledged KARYN CARMONA     Inpatient consult to Internal Medicine  Once        Provider:  Brain Cary Jr., MD    Acknowledged KARYN CARMONA             Procedures:   * No surgery found *     Brief History of Present Illness      52-year-old  male with past medical history of hypertension who presents the emergency department with hypertensive emergency.  Patient was being seen in [facility] but was sent to the emergency department after blood pressure noted to be significantly elevated.  Blood pressure initially in emergency department was 223/129 with the highest recorded at 260/129.  Patient states that she has a history of hypertension and has been on lisinopril 10 and nifedipine 60.  Despite taking medications blood pressure persistently elevated with systolic reportedly 160-170.  For the past 3 days he has not been able take his medication after running out and this is when he began to have abdominal fullness and bilateral flank pain.  He states that whenever he eats his abdomen gets distended and has worsening flank/kidney pain.  Associated with a high blood pressure he also complaints of dizziness, presyncope, headache, and blurry vision.  At this time he states that dizziness, presyncope, and headache have  resolved but continues to have some blurry vision.  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  No evidence of renal artery dissection or stenosis.  Incidental findings of ankylosing spondylitis of the spine and sacroiliac joints.    Hospital Course with Pertinent Findings     Admitted to the hospital with hypertensive emergency. CT-Abd/pelvis obtained in ED showed celiac artery dissection with extension into the common hepatic. No Aortic or renal artery dissection noted. Vascular Surgery was consulted. Recommended medical management (blood pressure) and to start on aspirin and plavix (once SBP <140). Patient on Lisinopril 10 and Nifedipine 60 at home with no obvious effect on blood pressure. Nifedipine was increased to 90 and Labetalol 200 was added. Blood pressure remained elevated (-180) therefore HCTZ and Hydralazine was added. All medication were titrated up for optimal BP control.   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 According to literature follow up after dissection is 1 month for repeat imaging to ensure stability and imaging once again 3-12 months thereafter. Will also refer to [facility] vascular surgery for follow up as well.     Discharge physical exam:  Vitals  BP: 134/83  Temp: 98.4 °F (36.9 °C)  Temp src: Oral  Pulse: 77  Resp: 17  SpO2: 100 %  Height: 6' (182.9 cm)  Weight: 93 kg (205 lb 0.4 oz)    Physical Exam  Vitals and nursing note reviewed.   Constitutional:       General: He is not in acute distress.     Appearance: Normal appearance. He is normal weight. He is not ill-appearing or toxic-appearing.   HENT:      Head: Normocephalic and atraumatic.      Mouth/Throat:      Mouth: Mucous membranes are moist.      Pharynx: Oropharynx is clear.   Eyes:      General: No scleral icterus.        Right eye: No discharge.         Left eye: No discharge.      Extraocular Movements:  Extraocular movements intact.      Conjunctiva/sclera: Conjunctivae normal.      Pupils: Pupils are equal, round, and reactive to light.   Cardiovascular:      Rate and Rhythm: Normal rate and regular rhythm.      Pulses: Normal pulses.      Heart sounds: Normal heart sounds. No murmur heard.    No gallop.   Pulmonary:      Effort: Pulmonary effort is normal. No respiratory distress.      Breath sounds: Normal breath sounds. No stridor. No wheezing, rhonchi or rales.   Abdominal:      General: Bowel sounds are normal. There is no distension.      Palpations: Abdomen is soft.      Tenderness: There is no abdominal tenderness. There is no guarding or rebound.   Musculoskeletal:         General: No swelling.      Right lower leg: No edema.      Left lower leg: No edema.   Skin:     General: Skin is warm and dry.   Neurological:      General: No focal deficit present.      Mental Status: He is alert and oriented to person, place, and time.   Psychiatric:         Mood and Affect: Mood normal.         Behavior: Behavior normal.        TIME SPENT ON DISCHARGE: 60 minutes    Discharge Medications        Medication List        START taking these medications      aspirin 81 MG EC tablet  Commonly known as: ECOTRIN  Take 1 tablet (81 mg total) by mouth once daily.     clopidogreL 75 mg tablet  Commonly known as: PLAVIX  Take 1 tablet (75 mg total) by mouth once daily.     hydrALAZINE 50 MG tablet  Commonly known as: APRESOLINE  Take 2 tablets (100 mg total) by mouth every 8 (eight) hours.     hydroCHLOROthiazide 50 MG tablet  Commonly known as: HYDRODIURIL  Take 1 tablet (50 mg total) by mouth once daily.     labetaloL 200 MG tablet  Commonly known as: NORMODYNE  Take 1 tablet (200 mg total) by mouth every 12 (twelve) hours.            CHANGE how you take these medications      NIFEdipine 90 MG (OSM) 24 hr tablet  Commonly known as: PROCARDIA-XL  Take 1 tablet (90 mg total) by mouth once daily.  What changed:   medication  strength  how much to take            STOP taking these medications      lisinopriL 10 MG tablet               Where to Get Your Medications        These medications were sent to [facility] [address] [place] [address]  [facility] [address] [address]      Phone: [phone]   aspirin 81 MG EC tablet  clopidogreL 75 mg tablet  hydrALAZINE 50 MG tablet  hydroCHLOROthiazide 50 MG tablet  labetaloL 200 MG tablet  NIFEdipine 90 MG (OSM) 24 hr tablet         Discharge Information:     Mr. Osito Durant is being discharged .    Discharge Procedure Orders   CT Abdomen Pelvis W Wo Contrast   Standing Status: Future Standing Exp. Date: 04/28/23     Order Specific Question Answer Comments   Is the patient allergic to iodine contrast? Unable to Assess    Is the patient taking metformin or a metformin combination medication?  If so, the patient should hold the medication for 2 days following contrast. No    Does this patient have impaired renal function? No    Diabetes? No    May the Radiologist modify the order per protocol to meet the clinical needs of the patient? Yes    Oral/Rectal Contrast instructions: NO Oral Contrast    Special CT ABD Protocol Request? Routine      Ambulatory referral/consult to Vascular Surgery   Standing Status: Future   Referral Priority: Routine Referral Type: Consultation   Referral Reason: Specialty Services Required   Requested Specialty: Vascular Surgery   Number of Visits Requested: 1     Ambulatory referral/consult to Internal Medicine   Standing Status: Future   Referral Priority: Routine Referral Type: Consultation   Referral Reason: Specialty Services Required Referral Location: [facility]   Requested Specialty: Internal Medicine   Number of Visits Requested: 1        Follow-Up Appointments:   Follow-up Information       [facility] Follow up.    Specialty: Vascular Surgery  Contact information:  [address]  [facility] [address] [phone]             [facility] Emergency Dept .    Specialty: Emergency Medicine  Contact information:  [address] [facility] [address] [phone]             [facility] Internal Medicine Follow up.    Specialty: Internal Medicine  Why: Post wards  Contact information:  [address] [facility] [address] [phone]                         Blood pressure     Tyron Tamayo DO  Internal Medicine - PGY-2

## 2023-01-30 ENCOUNTER — PATIENT OUTREACH (OUTPATIENT)
Dept: ADMINISTRATIVE | Facility: CLINIC | Age: 53
End: 2023-01-30

## 2023-01-30 ENCOUNTER — TELEPHONE (OUTPATIENT)
Dept: ADMINISTRATIVE | Facility: CLINIC | Age: 53
End: 2023-01-30

## 2023-01-30 ENCOUNTER — PATIENT MESSAGE (OUTPATIENT)
Dept: ADMINISTRATIVE | Facility: CLINIC | Age: 53
End: 2023-01-30

## 2023-01-30 ENCOUNTER — PATIENT MESSAGE (OUTPATIENT)
Dept: ADMINISTRATIVE | Facility: OTHER | Age: 53
End: 2023-01-30

## 2023-01-30 NOTE — PROGRESS NOTES
C3 nurse spoke with Osito Durant for a TCC post hospital discharge follow up call. The patient has a scheduled HOSFU appointment with [facility] Internal med clinic on 2/13/23 @ 12:45.

## 2023-02-06 ENCOUNTER — HOSPITAL ENCOUNTER (OUTPATIENT)
Dept: RADIOLOGY | Facility: HOSPITAL | Age: 53
Discharge: HOME | End: 2023-02-06
Attending: INTERNAL MEDICINE
Payer: COMMERCIAL

## 2023-02-06 DIAGNOSIS — I77.79 CELIAC ARTERY DISSECTION: ICD-10-CM

## 2023-02-06 PROCEDURE — 25500020 PHARM REV CODE 255: Performed by: INTERNAL MEDICINE

## 2023-02-06 PROCEDURE — 74178 CT ABD&PLV WO CNTR FLWD CNTR: CPT | Mod: TC

## 2023-02-06 RX ADMIN — IOHEXOL 100 ML: 350 INJECTION, SOLUTION INTRAVENOUS at 12:02

## 2024-03-05 RX ORDER — NIFEDIPINE 90 MG/1
90 TABLET, EXTENDED RELEASE ORAL DAILY
Qty: 30 TABLET | Refills: 11 | Status: SHIPPED | OUTPATIENT
Start: 2024-03-05 | End: 2025-03-05